# Patient Record
Sex: FEMALE | URBAN - METROPOLITAN AREA
[De-identification: names, ages, dates, MRNs, and addresses within clinical notes are randomized per-mention and may not be internally consistent; named-entity substitution may affect disease eponyms.]

---

## 2022-07-20 ENCOUNTER — WALK IN (OUTPATIENT)
Dept: URGENT CARE | Age: 22
End: 2022-07-20

## 2022-07-20 VITALS
WEIGHT: 131.6 LBS | DIASTOLIC BLOOD PRESSURE: 60 MMHG | RESPIRATION RATE: 16 BRPM | HEIGHT: 66 IN | BODY MASS INDEX: 21.15 KG/M2 | SYSTOLIC BLOOD PRESSURE: 100 MMHG | HEART RATE: 110 BPM | OXYGEN SATURATION: 98 % | TEMPERATURE: 98.7 F

## 2022-07-20 DIAGNOSIS — J02.9 PHARYNGITIS, UNSPECIFIED ETIOLOGY: Primary | ICD-10-CM

## 2022-07-20 LAB
INTERNAL PROCEDURAL CONTROLS ACCEPTABLE: YES
S PYO AG THROAT QL IA.RAPID: NEGATIVE

## 2022-07-20 PROCEDURE — 87880 STREP A ASSAY W/OPTIC: CPT | Performed by: NURSE PRACTITIONER

## 2022-07-20 PROCEDURE — 99203 OFFICE O/P NEW LOW 30 MIN: CPT | Performed by: NURSE PRACTITIONER

## 2022-07-20 PROCEDURE — 87081 CULTURE SCREEN ONLY: CPT | Performed by: INTERNAL MEDICINE

## 2022-07-20 ASSESSMENT — ENCOUNTER SYMPTOMS
CHILLS: 0
CONSTITUTIONAL NEGATIVE: 1
SWOLLEN GLANDS: 0
ABDOMINAL PAIN: 0
SINUS PAIN: 0
RESPIRATORY NEGATIVE: 1
CONSTIPATION: 0
HEADACHES: 0
TROUBLE SWALLOWING: 0
NAUSEA: 0
PSYCHIATRIC NEGATIVE: 1
WEAKNESS: 0
HOARSE VOICE: 0
RHINORRHEA: 0
EYES NEGATIVE: 1
SORE THROAT: 1
VOMITING: 0
FEVER: 0
APPETITE CHANGE: 0
DIARRHEA: 0
GASTROINTESTINAL NEGATIVE: 1
CHEST TIGHTNESS: 0
SHORTNESS OF BREATH: 0
LIGHT-HEADEDNESS: 0
COUGH: 0
SINUS PRESSURE: 0
DIZZINESS: 0
WHEEZING: 0
SEIZURES: 0
FATIGUE: 0
HEMATOLOGIC/LYMPHATIC NEGATIVE: 1
ENDOCRINE NEGATIVE: 1

## 2022-07-23 LAB — S PYO SPEC QL CULT: NORMAL

## 2022-07-26 ENCOUNTER — TELEPHONE (OUTPATIENT)
Dept: URGENT CARE | Age: 22
End: 2022-07-26

## 2023-02-09 DIAGNOSIS — M25.562 LEFT KNEE PAIN, UNSPECIFIED CHRONICITY: Primary | ICD-10-CM

## 2023-02-14 ENCOUNTER — HOSPITAL ENCOUNTER (OUTPATIENT)
Dept: PHYSICAL THERAPY | Age: 23
Setting detail: THERAPIES SERIES
Discharge: HOME | End: 2023-02-14
Payer: COMMERCIAL

## 2023-02-14 PROCEDURE — 97110 THERAPEUTIC EXERCISES: CPT

## 2023-02-14 PROCEDURE — 97161 PT EVAL LOW COMPLEX 20 MIN: CPT

## 2023-02-14 NOTE — FLOWSHEET NOTE
[place] [address] [facility]  Phone: [phone] Fax: [phone]    Physical Therapy Treatment Note/ Progress Report:       Date:  2023    Patient Name:  Miguel Angel Fitzpatrick    :  2000  MRN: [id number]  Restrictions/Precautions:    Medical/Treatment Diagnosis Information:   Diagnosis:  L knee pain   Treatment diagnosis:  M25.562 L knee pain; M22.22 Patellofemoral disorders of L knee       Insurance/Certification information:  Aetna - ded not met/no copay/auth   Physician Information:  Lilly Rangel MD  Plan of care signed (Y/N):     Date of Patient follow up with Physician:      Progress Report: [x]  Yes  []  No     Date Range for reporting period:  Beginnin23  Endin days or 10 visits    Progress report due (10 Rx/or 30 days whichever is less): 48     Recertification due (POC duration/ or 90 days whichever is less): 3/14/23     Visit # Insurance Allowable Auth Needed   1 Aetna - 20 PT visits []Yes    [x]No     Latex Allergy:  [x]NO      []YES  Preferred Language for Healthcare:   [x]English       []other:  Functional Scale: 10% disability - LEFS (72/80)     Date assessed:23    Pain level:  0/10 at start of session. 0/10 at best. 5-6/10 at worst.      SUBJECTIVE:  See eval    OBJECTIVE: See eval  Observation:   Test measurements:      RESTRICTIONS/PRECAUTIONS:     Exercises/Interventions:     Therapeutic Ex (91246)  Sets/sec Reps Notes/CUES   Retro Stepper/BIKE      [email or web address] LOP (80 mmHg)  8 min 30-15-15-15  SLR flexion/sidelying hip abd/SAQ over bolster/LAQ at EOB/standing hamstring curl   BOSU lunge isos fwd 5-10s 8-10 Fatigued quickly on her L. Leg press - 2 up SL down - 0-60 deg - 30# 1 10          Patient education.   8 min Findings, purpose, focus, goals, expectations of PT; HEP                                                   Manual Intervention (30818)      L patella mobs - medial glides  nv Pt last seen on 5/21/2021 by Dr Dean,  Letter went out to him to reschedule a November 2021 appointment,there is no future appointment,please call him to set up. Will refill bp med per Dr Dean.   IASTM to distal quad  nv                            NMR re-education (86094)   CUES NEEDED   Sammarinese/Biofeedback 10/10      BFR      G. Med activation      Hip Ext full ROM/ G. Activation      Bosu Bal and Prop- G Med      Single leg stance/Balance/Prop      Bosu Retro G. Med act      Prone Hip froggers- sliders/elevated            Therapeutic Activity (47608)      Ladders      [employer] Groupos      Dynamic Balance                            Therapeutic Exercise and NMR EXR  [x] (51289) Provided verbal/tactile cueing for activities related to strengthening, flexibility, endurance, ROM for improvements in LE, proximal hip, and core control with self care, mobility, lifting, ambulation. [x] (62066) Provided verbal/tactile cueing for activities related to improving balance, coordination, kinesthetic sense, posture, motor skill, proprioception  to assist with LE, proximal hip, and core control in self care, mobility, lifting, ambulation and eccentric single leg control.      NMR and Therapeutic Activities:    [x] (60191 or 50056) Provided verbal/tactile cueing for activities related to improving balance, coordination, kinesthetic sense, posture, motor skill, proprioception and motor activation to allow for proper function of core, proximal hip and LE with self care and ADLs and functional mobility.   [] (21474) Gait Re-education- Provided training and instruction to the patient for proper LE, core and proximal hip recruitment and positioning and eccentric body weight control with ambulation re-education including up and down stairs     Home Exercise Program:    [x] (92334) Reviewed/Progressed HEP activities related to strengthening, flexibility, endurance, ROM of core, proximal hip and LE for functional self-care, mobility, lifting and ambulation/stair navigation   [] (69227)Reviewed/Progressed HEP activities related to improving balance, coordination, kinesthetic sense, posture, motor skill, proprioception of core, proximal hip and LE for self care, mobility, lifting, and ambulation/stair navigation      Manual Treatments:  PROM / STM / Oscillations-Mobs:  G-I, II, III, IV (PA's, Inf., Post.)  [x] (24665) Provided manual therapy to mobilize LE, proximal hip and/or LS spine soft tissue/joints for the purpose of modulating pain, promoting relaxation,  increasing ROM, reducing/eliminating soft tissue swelling/inflammation/restriction, improving soft tissue extensibility and allowing for proper ROM for normal function with self care, mobility, lifting and ambulation. Modalities:     [] GAME READY (VASO)- for significant edema, swelling, pain control. Charges:  Timed Code Treatment Minutes: 22   Total Treatment Minutes: 45      [x] EVAL (LOW) 35148 (typically 20 minutes face-to-face)  [] EVAL (MOD) 77666 (typically 30 minutes face-to-face)  [] EVAL (HIGH) 39697 (typically 45 minutes face-to-face)  [] RE-EVAL     [x] YB(99526) x 1    [] DRY NEEDLE 1 OR 2 MUSCLES  [] NMR (93219) x     [] DRY NEEDLE 3+ MUSCLES  [] Manual (34707) x       [] TA (20802) x     [] Mech Traction (30212)  [] ES(attended) (51150)     [] ES (un) (91200):   [] VASO (48614)  [] Other:      GOALS:  Patient stated goal: Be able to lift without pain. [] Progressing: [] Met: [] Not Met: [] Adjusted  Therapist goals for Patient:   Short Term Goals: To be achieved in: 2 weeks  1. Independent in HEP and progression per patient tolerance, in order to prevent re-injury. [] Progressing: [] Met: [] Not Met: [] Adjusted  2. Patient will have a decrease in pain to facilitate improvement in movement, function, and ADLs as indicated by Functional Deficits. [] Progressing: [] Met: [] Not Met: [] Adjusted    Long Term Goals: To be achieved in: 6-8 weeks  1. Disability index score of 5% or less for the LEFS to assist with reaching prior level of function. [] Progressing: [] Met: [] Not Met: [] Adjusted  2.  Patient will demonstrate an increase in hip and quad strength and control, within 5# HHD in LE to allow for proper functional mobility as indicated by patients Functional Deficits. [] Progressing: [] Met: [] Not Met: [] Adjusted  3. Patient will be able to go up/down stairs with reciprocal mechanics without increased symptoms or restriction. [] Progressing: [] Met: [] Not Met: [] Adjusted  4. Patient will be able to squat and lunge without increased symptoms or restriction. [] Progressing: [] Met: [] Not Met: [] Adjusted     ASSESSMENT:  See eval    Return to Play: (if applicable)   []  Stage 1: Intro to Strength   []  Stage 2: Return to Run and Strength   []  Stage 3: Return to Jump and Strength   []  Stage 4: Dynamic Strength and Agility   []  Stage 5: Sport Specific Training     []  Ready to Return to Play, Meets All Above Stages   []  Not Ready for Return to Sports   Comments:            Treatment/Activity Tolerance:  [x] Patient tolerated treatment well [] Patient limited by fatique  [] Patient limited by pain  [] Patient limited by other medical complications  [] Other:     Overall Progression Towards Functional goals/ Treatment Progress Update:  [] Patient is progressing as expected towards functional goals listed. [] Progression is slowed due to complexities/Impairments listed. [] Progression has been slowed due to co-morbidities.   [x] Plan just implemented, too soon to assess goals progression <30days   [] Goals require adjustment due to lack of progress  [] Patient is not progressing as expected and requires additional follow up with physician  [] Other    Prognosis for POC: [x] Good [] Fair  [] Poor    Patient requires continued skilled intervention: [x] Yes  [] No        PLAN: See eval  [] Continue per plan of care [] Alter current plan (see comments)  [x] Plan of care initiated [] Hold pending MD visit [] Discharge    Electronically signed by: Kelvin Gardner, PT, DPT, MS, SCS    Note: If patient does not return for scheduled/recommended follow up visits, this note will serve as a discharge from care along with the most recent update on progress.

## 2023-02-14 NOTE — PLAN OF CARE
[facility]retchen Cazares  Phone: [phone]   Fax:     [phone]                                                       Physical Therapy Certification    Dear Tressa Smith MD,    We had the pleasure of evaluating the following patient for physical therapy services at [address] [place] [address]. A summary of our findings can be found in the initial assessment below. This includes our plan of care. If you have any questions or concerns regarding these findings, please do not hesitate to contact me at the office phone number checked above. Thank you for the referral.       Physician Signature:_______________________________Date:__________________  By signing above (or electronic signature), therapists plan is approved by physician      Patient: Theo Soto   : 2000   MRN: [id number]  Referring Physician: Tressa Smith MD      Evaluation Date: 2023     Medical Diagnosis Information:   Diagnosis:  L knee pain   Treatment diagnosis:  M25.562 L knee pain; M22.22 Patellofemoral disorders of L knee                                          Insurance information:  Aetna - ded not met/no copay/auth     Precautions/ Contra-indications: None. Latex Allergy:  [x]NO      []YES  Preferred Language for Healthcare:   [x]English       []other:    C-SSRS Triggered by Intake questionnaire (Past 2 wk assessment ):   [x] No, Questionnaire did not trigger screening.   [] Yes, Patient intake triggered C-SSRS Screening      [] C-SSRS Screening completed  [] PCP notified via Epic     SUBJECTIVE: Patient stated complaint:  Patient has had L>R knee pain on and off since she was in high school in competitive cheerleading, however, her L pain has gotten worse, more frequent and intense over the past month or so.  She does regularly go to the gym and lift weights for strengthening and she typically enjoys doing the stair stepper for cardio. She has had to modify these activities over the past month due to worsening pain in the L knee after her workouts. Patient does not recall any major changes in her typical workout regimen or daily activities that could have made the pain worsen. Primarily having to modify activities like lunges, squatting, and step ups/downs because they worsen the pain. Relevant Medical History:  None. Functional Scale/Score:  10% disability - LEFS (72/80)    Pain Scale: 0/10 at start of session. 0/10 at best. 5-6/10 at worst.   Easing factors: Avoiding aggrevating activities. Provocative factors: Squatting. Lunging. Going up stairs. Type: []Constant   [x]Intermittent  []Radiating [x]Localized []other:     Numbness/Tingling: Denies N/T. Occupation/School: [employer] at [employer], [employer], and [address] [place] [address]. Living Status/Prior Level of Function: Independent with ADLs and IADLs. Would like to be able to work out at the rec center daily doing free weights, machines, and cardio. OBJECTIVE:     ROM LEFT RIGHT   HIP Flex WNL WNL   HIP Abd     HIP Ext     HIP IR WNL WNL   HIP ER WNL WNL   Knee ext 0//+2 Hyper 0/+2 Hyper   Knee Flex 142 142   Ankle PF     Ankle DF     Ankle In     Ankle Ev     Strength  LEFT RIGHT   HIP Flexors     HIP Abductors 19.3# 23.0#   HIP Ext 20.1# 19.5#   Hip ER     Knee EXT (quad) 30.6# 33.7#   Knee Flex (HS) 31.6# 27.5#   Ankle DF     Ankle PF     Ankle Inv     Ankle EV          Circumference  Mid apex  7 cm prox     No major swelling/edema evident. No major swelling/edema evident. Reflexes/Sensation:    [x]Dermatomes/Myotomes intact    [x]Reflexes equal and normal bilaterally   []Other:    Joint mobility: L tibiofemoral; L patella   [x]Normal    []Hypo   []Hyper    Palpation: Patient is tender to distal quad more at the VMO and along medial and lateral joint lines.     Functional Mobility/Transfers:     Posture:     Gait: WNL    Orthopedic Special Tests: Hamstring flexibility WNL bilaterally. Rectus femoris mildly restricted bilaterally in prone. [x] Patient history, allergies, meds reviewed. Medical chart reviewed. See intake form. Review Of Systems (ROS):  [x]Performed Review of systems (Integumentary, CardioPulmonary, Neurological) by intake and observation. Intake form has been scanned into medical record. Patient has been instructed to contact their primary care physician regarding ROS issues if not already being addressed at this time.       Co-morbidities/Complexities (which will affect course of rehabilitation):   [x]None           Arthritic conditions   []Rheumatoid arthritis (M05.9)  []Osteoarthritis (M19.91)   Cardiovascular conditions   []Hypertension (I10)  []Hyperlipidemia (E78.5)  []Angina pectoris (I20)  []Atherosclerosis (I70)  []CVA Musculoskeletal conditions   []Disc pathology   []Congenital spine pathologies   []Prior surgical intervention  []Osteoporosis (M81.8)  []Osteopenia (M85.8)   Endocrine conditions   []Hypothyroid (E03.9)  []Hyperthyroid Gastrointestinal conditions   []Constipation (A68.63)   Metabolic conditions   []Morbid obesity (E66.01)  []Diabetes type 1(E10.65) or 2 (E11.65)   []Neuropathy (G60.9)     Pulmonary conditions   []Asthma (J45)  []Coughing   []COPD (J44.9)   Psychological Disorders  []Anxiety (F41.9)  []Depression (F32.9)   []Other:   []Other:          Barriers to/and or personal factors that will affect rehab potential:              []Age  []Sex    []Smoker              []Motivation/Lack of Motivation                        []Co-Morbidities              []Cognitive Function, education/learning barriers              []Environmental, home barriers              []profession/work barriers  []past PT/medical experience  [x]other:  chronic on/off nature of pain sxs  Justification: Chronic on/off nature of pain sxs indicates increased likelihood for prolonged prognosis for full, safe return to PLOF without restrictions. Falls Risk Assessment (30 days):   [x] Falls Risk assessed and no intervention required. [] Falls Risk assessed and Patient requires intervention due to being higher risk   TUG score (>12s at risk):     [] Falls education provided, including         ASSESSMENT: s/s consistent with patellofemoral pain syndrome, patellofemoral tracking issues  Functional Impairments:     [x]Noted lumbar/proximal hip/LE hypomobility   [x]Decreased LE functional ROM   [x]Decreased core/proximal hip strength and neuromuscular control   [x]Decreased LE functional strength   [x]Reduced balance/proprioceptive control   []other:      Functional Activity Limitations (from functional questionnaire and intake)   [x]Reduced ability to tolerate prolonged functional positions   [x]Reduced ability or difficulty with changes of positions or transfers between positions   [x]Reduced ability to maintain good posture and demonstrate good body mechanics with sitting, bending, and lifting   []Reduced ability to sleep   [] Reduced ability or tolerance with driving and/or computer work   [x]Reduced ability to perform lifting, carrying tasks   [x]Reduced ability to squat   []Reduced ability to forward bend   []Reduced ability to ambulate prolonged functional periods/distances/surfaces   [x]Reduced ability to ascend/descend stairs   [x]Reduced ability to run, hop or jump   []other:     Participation Restrictions   [x]Reduced participation in self care activities   [x]Reduced participation in home management activities   []Reduced participation in work activities   [x]Reduced participation in social activities. [x]Reduced participation in sport activities. Classification :    []Signs/symptoms consistent with post-surgical status including decreased ROM, strength and function.    []Signs/symptoms consistent with joint sprain/strain   [x]Signs/symptoms consistent with patella-femoral syndrome   []Signs/symptoms consistent with knee OA/hip OA   []Signs/symptoms consistent with internal derangement of knee/Hip   []Signs/symptoms consistent with functional hip weakness/NMR control      []Signs/symptoms consistent with tendinitis/tendinosis    [x]signs/symptoms consistent with pathology which may benefit from Dry needling      []other:      Prognosis/Rehab Potential:      [x]Excellent   []Good    []Fair   []Poor    Tolerance of evaluation/treatment:    [x]Excellent   []Good    []Fair   []Poor    Physical Therapy Evaluation Complexity Justification  [x] A history of present problem with:  [x] no personal factors and/or comorbidities that impact the plan of care;  []1-2 personal factors and/or comorbidities that impact the plan of care  []3 personal factors and/or comorbidities that impact the plan of care  [x] An examination of body systems using standardized tests and measures addressing any of the following: body structures and functions (impairments), activity limitations, and/or participation restrictions;:  [x] a total of 1-2 or more elements   [] a total of 3 or more elements   [] a total of 4 or more elements   [x] A clinical presentation with:  [x] stable and/or uncomplicated characteristics   [] evolving clinical presentation with changing characteristics  [] unstable and unpredictable characteristics;   [x] Clinical decision making of [x] low, [] moderate, [] high complexity using standardized patient assessment instrument and/or measurable assessment of functional outcome.     [x] EVAL (LOW) 86669 (typically 20 minutes face-to-face)  [] EVAL (MOD) 94325 (typically 30 minutes face-to-face)  [] EVAL (HIGH) 20247 (typically 45 minutes face-to-face)  [] RE-EVAL     Frequency/Duration:  2 days per week for 6-8 Weeks:  Interventions:  [x]  Therapeutic exercise including: strength training, ROM, for Lower extremity and core   [x]  NMR activation and proprioception for LE, Glutes and Core   [x]  Manual therapy as indicated for LE, Hip and spine to include: Dry Needling/IASTM, STM, PROM, Gr I-IV mobilizations, manipulation. [x] Modalities as needed that may include: thermal agents, E-stim, Biofeedback, US, iontophoresis as indicated  [x] Patient education on joint protection, postural re-education, activity modification, progression of HEP. GOALS:  Patient stated goal: Be able to lift without pain. [] Progressing: [] Met: [] Not Met: [] Adjusted  Therapist goals for Patient:   Short Term Goals: To be achieved in: 2 weeks  1. Independent in HEP and progression per patient tolerance, in order to prevent re-injury. [] Progressing: [] Met: [] Not Met: [] Adjusted  2. Patient will have a decrease in pain to facilitate improvement in movement, function, and ADLs as indicated by Functional Deficits. [] Progressing: [] Met: [] Not Met: [] Adjusted    Long Term Goals: To be achieved in: 6-8 weeks  1. Disability index score of 5% or less for the LEFS to assist with reaching prior level of function. [] Progressing: [] Met: [] Not Met: [] Adjusted  2. Patient will demonstrate an increase in hip and quad strength and control, within 5# HHD in LE to allow for proper functional mobility as indicated by patients Functional Deficits. [] Progressing: [] Met: [] Not Met: [] Adjusted  3. Patient will be able to go up/down stairs with reciprocal mechanics without increased symptoms or restriction. [] Progressing: [] Met: [] Not Met: [] Adjusted  4. Patient will be able to squat and lunge without increased symptoms or restriction.   [] Progressing: [] Met: [] Not Met: [] Adjusted     Electronically signed by:  Ching Cary, PT, DPT, MS, SCS

## 2023-02-16 ENCOUNTER — HOSPITAL ENCOUNTER (OUTPATIENT)
Dept: PHYSICAL THERAPY | Age: 23
Setting detail: THERAPIES SERIES
Discharge: HOME | End: 2023-02-16
Payer: COMMERCIAL

## 2023-02-16 PROCEDURE — 97140 MANUAL THERAPY 1/> REGIONS: CPT

## 2023-02-16 PROCEDURE — 97110 THERAPEUTIC EXERCISES: CPT

## 2023-02-16 NOTE — FLOWSHEET NOTE
[place] [address] [facility]  Phone: [phone] Fax: [phone]    Physical Therapy Treatment Note/ Progress Report:       Date:  2023    Patient Name:  Jennifer Galarza    :  2000  MRN: [id number]  Restrictions/Precautions:    Medical/Treatment Diagnosis Information:   Diagnosis:  L knee pain   Treatment diagnosis:  M25.562 L knee pain; M22.22 Patellofemoral disorders of L knee       Insurance/Certification information:  [employer]na - ded not met/no copay/auth   Physician Information:  Liss Vasquez MD  Plan of care signed (Y/N):     Date of Patient follow up with Physician:      Progress Report: []  Yes  [x]  No     Date Range for reporting period:  Beginnin23  Endin days or 10 visits    Progress report due (10 Rx/or 30 days whichever is less):      Recertification due (POC duration/ or 90 days whichever is less): 3/14/23     Visit # Insurance Allowable Auth Needed   2 [employer]na - 20 PT visits []Yes    [x]No     Latex Allergy:  [x]NO      []YES  Preferred Language for Healthcare:   [x]English       []other:  Functional Scale: 10% disability - LEFS (72/80)     Date assessed:23    Pain level:  0/10 at start of session. 0/10 at best. 5-6/10 at worst.      SUBJECTIVE:  States that initially after the session her L knee felt good, but the pain was delayed and came on about 30 minutes after she left. OBJECTIVE: See eval  Observation:   Test measurements:      RESTRICTIONS/PRECAUTIONS:     Exercises/Interventions:     Therapeutic Ex (22256)  Sets/sec Reps Notes/CUES   Retro Stepper/BIKE      Alexus@[employer] LOP (80 mmHg)  8 min 30-15-15-15  SLR flexion/sidelying hip abd/SAQ over bolster/LAQ at EOB/standing hamstring curl   BOSU lunge isos fwd 5s 10 Fatigued quickly on her L.    Leg press - isos - DL - HEAVY 10s 10 45 deg; 60 deg                                                               Manual Intervention (20518)      L patella mobs - medial glides  10 min Bilat; Gr. II-III   IASTM to distal rectus femoris, ITB, VL  12 min bilat                           NMR re-education (94927)   CUES NEEDED   [place]/Biofeedback 10/10      BFR      G. Med activation      Hip Ext full ROM/ G. Activation      Bosu Bal and Prop- G Med      Single leg stance/Balance/Prop      Bosu Retro G. Med act      Prone Hip froggers- sliders/elevated            Therapeutic Activity (18050)      Ladders      Plyos      Dynamic Balance                            Therapeutic Exercise and NMR EXR  [x] (43933) Provided verbal/tactile cueing for activities related to strengthening, flexibility, endurance, ROM for improvements in LE, proximal hip, and core control with self care, mobility, lifting, ambulation. [x] (21097) Provided verbal/tactile cueing for activities related to improving balance, coordination, kinesthetic sense, posture, motor skill, proprioception  to assist with LE, proximal hip, and core control in self care, mobility, lifting, ambulation and eccentric single leg control.      NMR and Therapeutic Activities:    [x] (69025 or 68530) Provided verbal/tactile cueing for activities related to improving balance, coordination, kinesthetic sense, posture, motor skill, proprioception and motor activation to allow for proper function of core, proximal hip and LE with self care and ADLs and functional mobility.   [] (45226) Gait Re-education- Provided training and instruction to the patient for proper LE, core and proximal hip recruitment and positioning and eccentric body weight control with ambulation re-education including up and down stairs     Home Exercise Program:    [x] (31587) Reviewed/Progressed HEP activities related to strengthening, flexibility, endurance, ROM of core, proximal hip and LE for functional self-care, mobility, lifting and ambulation/stair navigation   [] (31968)Reviewed/Progressed HEP activities related to improving balance, coordination, kinesthetic sense, posture, motor skill, proprioception of core, proximal hip and LE for self care, mobility, lifting, and ambulation/stair navigation      Manual Treatments:  PROM / STM / Oscillations-Mobs:  G-I, II, III, IV (PA's, Inf., Post.)  [x] (20449) Provided manual therapy to mobilize LE, proximal hip and/or LS spine soft tissue/joints for the purpose of modulating pain, promoting relaxation,  increasing ROM, reducing/eliminating soft tissue swelling/inflammation/restriction, improving soft tissue extensibility and allowing for proper ROM for normal function with self care, mobility, lifting and ambulation. Modalities:     [] GAME READY (VASO)- for significant edema, swelling, pain control. Charges:  Timed Code Treatment Minutes: 45   Total Treatment Minutes: 45      [] EVAL (LOW) 87794 (typically 20 minutes face-to-face)  [] EVAL (MOD) 91374 (typically 30 minutes face-to-face)  [] EVAL (HIGH) 44175 (typically 45 minutes face-to-face)  [] RE-EVAL     [x] BH(00380) x 1    [] DRY NEEDLE 1 OR 2 MUSCLES  [] NMR (42603) x     [] DRY NEEDLE 3+ MUSCLES  [x] Manual (41618) x 2      [] TA (99185) x     [] Mech Traction (66943)  [] ES(attended) (38089)     [] ES (un) (21731):   [] VASO (10321)  [] Other:      GOALS:  Patient stated goal: Be able to lift without pain. [] Progressing: [] Met: [] Not Met: [] Adjusted  Therapist goals for Patient:   Short Term Goals: To be achieved in: 2 weeks  1. Independent in HEP and progression per patient tolerance, in order to prevent re-injury. [] Progressing: [] Met: [] Not Met: [] Adjusted  2. Patient will have a decrease in pain to facilitate improvement in movement, function, and ADLs as indicated by Functional Deficits. [] Progressing: [] Met: [] Not Met: [] Adjusted    Long Term Goals: To be achieved in: 6-8 weeks  1. Disability index score of 5% or less for the LEFS to assist with reaching prior level of function.    [] Progressing: [] Met: [] Not Met: [] Adjusted  2. Patient will demonstrate an increase in hip and quad strength and control, within 5# HHD in LE to allow for proper functional mobility as indicated by patients Functional Deficits. [] Progressing: [] Met: [] Not Met: [] Adjusted  3. Patient will be able to go up/down stairs with reciprocal mechanics without increased symptoms or restriction. [] Progressing: [] Met: [] Not Met: [] Adjusted  4. Patient will be able to squat and lunge without increased symptoms or restriction. [] Progressing: [] Met: [] Not Met: [] Adjusted     ASSESSMENT:  Reports delayed onset of L knee discomfort following previous session. Patellar mobility is significantly restricted on both knees, particularly medial patellar glide. Addressed patellar restrictions with mobilizations and IASTM to tight, tender distal rectus femoris and vastus lateralis followed by gentle quad loading tasks including BFR, focusing on isometrics to load without further irritating patient's pain sxs. Will f/u about response at nv considering delayed response previously. Return to Play: (if applicable)   []  Stage 1: Intro to Strength   []  Stage 2: Return to Run and Strength   []  Stage 3: Return to Jump and Strength   []  Stage 4: Dynamic Strength and Agility   []  Stage 5: Sport Specific Training     []  Ready to Return to Play, Meets All Above Stages   []  Not Ready for Return to Sports   Comments:            Treatment/Activity Tolerance:  [x] Patient tolerated treatment well [] Patient limited by fatique  [] Patient limited by pain  [] Patient limited by other medical complications  [] Other:     Overall Progression Towards Functional goals/ Treatment Progress Update:  [] Patient is progressing as expected towards functional goals listed. [] Progression is slowed due to complexities/Impairments listed. [] Progression has been slowed due to co-morbidities.   [x] Plan just implemented, too soon to assess goals progression <30days   [] Goals require adjustment due to lack of progress  [] Patient is not progressing as expected and requires additional follow up with physician  [] Other    Prognosis for POC: [x] Good [] Fair  [] Poor    Patient requires continued skilled intervention: [x] Yes  [] No        PLAN: 2x per week for 2-3 weeks  [x] Continue per plan of care [] Alter current plan (see comments)  [] Plan of care initiated [] Hold pending MD visit [] Discharge    Electronically signed by: Isidro Izquierdo PT, DPT, MS, SCS    Note: If patient does not return for scheduled/recommended follow up visits, this note will serve as a discharge from care along with the most recent update on progress.

## 2023-02-21 ENCOUNTER — HOSPITAL ENCOUNTER (OUTPATIENT)
Dept: PHYSICAL THERAPY | Age: 23
Setting detail: THERAPIES SERIES
Discharge: HOME | End: 2023-02-21
Payer: COMMERCIAL

## 2023-02-21 PROCEDURE — 97140 MANUAL THERAPY 1/> REGIONS: CPT

## 2023-02-21 NOTE — FLOWSHEET NOTE
[place] [address] [facility]  Phone: [phone] Fax: [phone]    Physical Therapy Treatment Note/ Progress Report:       Date:  2023    Patient Name:  Arlene Ewing    :  2000  MRN: [id number]  Restrictions/Precautions:    Medical/Treatment Diagnosis Information:   Diagnosis:  L knee pain   Treatment diagnosis:  M25.562 L knee pain; M22.22 Patellofemoral disorders of L knee       Insurance/Certification information:  Aetna - ded not met/no copay/auth   Physician Information:  Patricia Varghese MD  Plan of care signed (Y/N):     Date of Patient follow up with Physician:      Progress Report: []  Yes  [x]  No     Date Range for reporting period:  Beginnin23  Endin days or 10 visits    Progress report due (10 Rx/or 30 days whichever is less):      Recertification due (POC duration/ or 90 days whichever is less): 3/14/23     Visit # Insurance Allowable Auth Needed   3 [employer] - 20 PT visits []Yes    [x]No     Latex Allergy:  [x]NO      []YES  Preferred Language for Healthcare:   [x]English       []other:  Functional Scale: 10% disability - LEFS (72/80)     Date assessed:23    Pain level:  0/10 at start of session. 0/10 at best. 5-6/10 at worst.      SUBJECTIVE:  Reports no change in pain sxs following previous session. Pain no worse and no better. OBJECTIVE: See eval  Observation:   Test measurements:      RESTRICTIONS/PRECAUTIONS:     Exercises/Interventions:     Therapeutic Ex (01957)  Sets/sec Reps Notes/CUES   Retro Stepper/BIKE      Half kneeling rectus femoris stretch w/strap 30s 3 tatyanaat   [email or web address] LOP (80 mmHg)  8 min 30-15-15-15  SLR flexion/sidelying hip abd/SAQ over bolster/LAQ at EOB/standing hamstring curl   BOSU lunge isos fwd 5s 10 Fatigued quickly on her L.    Leg press - isos - DL - HEAVY 10s 10 45 deg; 60 deg                                                               Manual Intervention (41666)      L patella mobs - medial glides  12 min Bilat; Gr. II-III   IASTM to distal rectus femoris, ITB, VL  18 min bilat                           NMR re-education (28634)   CUES NEEDED   Cymro/Biofeedback 10/10      BFR      G. Med activation      Hip Ext full ROM/ G. Activation      Bosu Bal and Prop- G Med      Single leg stance/Balance/Prop      Bosu Retro G. Med act      Prone Hip froggers- sliders/elevated            Therapeutic Activity (76174)      Ladders      Plyos      Dynamic Balance                            Therapeutic Exercise and NMR EXR  [x] (33611) Provided verbal/tactile cueing for activities related to strengthening, flexibility, endurance, ROM for improvements in LE, proximal hip, and core control with self care, mobility, lifting, ambulation. [x] (20351) Provided verbal/tactile cueing for activities related to improving balance, coordination, kinesthetic sense, posture, motor skill, proprioception  to assist with LE, proximal hip, and core control in self care, mobility, lifting, ambulation and eccentric single leg control.      NMR and Therapeutic Activities:    [x] (44159 or 25643) Provided verbal/tactile cueing for activities related to improving balance, coordination, kinesthetic sense, posture, motor skill, proprioception and motor activation to allow for proper function of core, proximal hip and LE with self care and ADLs and functional mobility.   [] (59644) Gait Re-education- Provided training and instruction to the patient for proper LE, core and proximal hip recruitment and positioning and eccentric body weight control with ambulation re-education including up and down stairs     Home Exercise Program:    [x] (27243) Reviewed/Progressed HEP activities related to strengthening, flexibility, endurance, ROM of core, proximal hip and LE for functional self-care, mobility, lifting and ambulation/stair navigation   [] (08307)Reviewed/Progressed HEP activities related to improving balance, coordination, kinesthetic sense, posture, motor skill, proprioception of core, proximal hip and LE for self care, mobility, lifting, and ambulation/stair navigation      Manual Treatments:  PROM / STM / Oscillations-Mobs:  G-I, II, III, IV (PA's, Inf., Post.)  [x] (77608) Provided manual therapy to mobilize LE, proximal hip and/or LS spine soft tissue/joints for the purpose of modulating pain, promoting relaxation,  increasing ROM, reducing/eliminating soft tissue swelling/inflammation/restriction, improving soft tissue extensibility and allowing for proper ROM for normal function with self care, mobility, lifting and ambulation. Modalities:     [] GAME READY (VASO)- for significant edema, swelling, pain control. Charges:  Timed Code Treatment Minutes: 35   Total Treatment Minutes: 35      [] EVAL (LOW) 04188 (typically 20 minutes face-to-face)  [] EVAL (MOD) 81775 (typically 30 minutes face-to-face)  [] EVAL (HIGH) 90837 (typically 45 minutes face-to-face)  [] RE-EVAL     [] [place](20565) x     [] DRY NEEDLE 1 OR 2 MUSCLES  [] NMR (57808) x     [] DRY NEEDLE 3+ MUSCLES  [x] Manual (99751) x 2      [] TA (64318) x     [] Mech Traction (22210)  [] ES(attended) (68287)     [] ES (un) (02771):   [] VASO (03093)  [] Other:      GOALS:  Patient stated goal: Be able to lift without pain. [] Progressing: [] Met: [] Not Met: [] Adjusted  Therapist goals for Patient:   Short Term Goals: To be achieved in: 2 weeks  1. Independent in HEP and progression per patient tolerance, in order to prevent re-injury. [] Progressing: [] Met: [] Not Met: [] Adjusted  2. Patient will have a decrease in pain to facilitate improvement in movement, function, and ADLs as indicated by Functional Deficits. [] Progressing: [] Met: [] Not Met: [] Adjusted    Long Term Goals: To be achieved in: 6-8 weeks  1. Disability index score of 5% or less for the LEFS to assist with reaching prior level of function.    [] Progressing: [] Met: [] Not Met: [] Adjusted  2. Patient will demonstrate an increase in hip and quad strength and control, within 5# HHD in LE to allow for proper functional mobility as indicated by patients Functional Deficits. [] Progressing: [] Met: [] Not Met: [] Adjusted  3. Patient will be able to go up/down stairs with reciprocal mechanics without increased symptoms or restriction. [] Progressing: [] Met: [] Not Met: [] Adjusted  4. Patient will be able to squat and lunge without increased symptoms or restriction. [] Progressing: [] Met: [] Not Met: [] Adjusted     ASSESSMENT:  Significant focus on addressing patellar mobility restrictions today with manual techniques and self stretching. Will likely trial DN to areas of soft tissue restriction at nv to see how this affects patient's pain sxs bilaterally. Return to Play: (if applicable)   []  Stage 1: Intro to Strength   []  Stage 2: Return to Run and Strength   []  Stage 3: Return to Jump and Strength   []  Stage 4: Dynamic Strength and Agility   []  Stage 5: Sport Specific Training     []  Ready to Return to Play, Meets All Above Stages   []  Not Ready for Return to Sports   Comments:            Treatment/Activity Tolerance:  [x] Patient tolerated treatment well [] Patient limited by fatique  [] Patient limited by pain  [] Patient limited by other medical complications  [] Other:     Overall Progression Towards Functional goals/ Treatment Progress Update:  [] Patient is progressing as expected towards functional goals listed. [] Progression is slowed due to complexities/Impairments listed. [] Progression has been slowed due to co-morbidities.   [x] Plan just implemented, too soon to assess goals progression <30days   [] Goals require adjustment due to lack of progress  [] Patient is not progressing as expected and requires additional follow up with physician  [] Other    Prognosis for POC: [x] Good [] Fair  [] Poor    Patient requires continued skilled intervention: [x] Yes  [] No        PLAN: 2x per week for 2-3 weeks  [x] Continue per plan of care [] Alter current plan (see comments)  [] Plan of care initiated [] Hold pending MD visit [] Discharge    Electronically signed by: Dejan Cazares, PT, DPT, MS, SCS    Note: If patient does not return for scheduled/recommended follow up visits, this note will serve as a discharge from care along with the most recent update on progress.

## 2023-02-23 ENCOUNTER — HOSPITAL ENCOUNTER (OUTPATIENT)
Dept: PHYSICAL THERAPY | Age: 23
Setting detail: THERAPIES SERIES
Discharge: HOME | End: 2023-02-23
Payer: COMMERCIAL

## 2023-02-23 PROCEDURE — 97140 MANUAL THERAPY 1/> REGIONS: CPT

## 2023-02-23 PROCEDURE — 97110 THERAPEUTIC EXERCISES: CPT

## 2023-02-23 NOTE — FLOWSHEET NOTE
[place] [address] [facility], [address]  Phone: [phone] Fax: [phone]    Physical Therapy Treatment Note/ Progress Report:       Date:  2023    Patient Name:  Reginald Villareal    :  2000  MRN: [id number]  Restrictions/Precautions:    Medical/Treatment Diagnosis Information:   Diagnosis:  L knee pain   Treatment diagnosis:  M25.562 L knee pain; M22.22 Patellofemoral disorders of L knee       Insurance/Certification information:  Aetna - ded not met/no copay/auth   Physician Information:  Lamberto Campa MD  Plan of care signed (Y/N):     Date of Patient follow up with Physician:      Progress Report: []  Yes  [x]  No     Date Range for reporting period:  Beginnin23  Endin days or 10 visits    Progress report due (10 Rx/or 30 days whichever is less): 56     Recertification due (POC duration/ or 90 days whichever is less): 3/14/23     Visit # Insurance Allowable Auth Needed   4 Aetna - 20 PT visits []Yes    [x]No     Latex Allergy:  [x]NO      []YES  Preferred Language for Healthcare:   [x]English       []other:  Functional Scale: 10% disability - LEFS (72/80)     Date assessed:23    Pain level:  0/10 at start of session. 0/10 at best. 5-6/10 at worst.      SUBJECTIVE:  Reports no change in pain sxs following previous session. Pain no worse and no better. OBJECTIVE: See eval  Observation:   Test measurements:      RESTRICTIONS/PRECAUTIONS:     Exercises/Interventions:     Therapeutic Ex (92485)  Sets/sec Reps Notes/CUES   Retro Stepper/BIKE      Half kneeling rectus femoris stretch w/strap 30s 3 bilat   [email or web address] LOP (93 mmHg)  8 min 30-15-15-15  SLR flexion/sidelying hip abd/SAQ over bolster/LAQ at EOB   BOSU lunge isos fwd 5s 10 Fatigued quickly on her L.    Leg press  - 2 up SL down - 55# 2 10 Ecc focus   Wall sits - blue TB loop at knees      Standing hip abd isos against wall 5s 10 bilat   SL glut bridge 5s 10 bilat                                                   Manual Intervention (66526)      L patella mobs - medial glides  12 min Bilat; Gr. II-III   IASTM to distal rectus femoris, ITB, VL  6 min bilat                           NMR re-education (29532)   CUES NEEDED   [place]/Biofeedback 10/10      BFR      G. Med activation      Hip Ext full ROM/ G. Activation      Bosu Bal and Prop- G Med      Single leg stance/Balance/Prop      Bosu Retro G. Med act      Prone Hip froggers- sliders/elevated            Therapeutic Activity (69088)      [employer]      Dynamic Balance                            Therapeutic Exercise and NMR EXR  [x] (88085) Provided verbal/tactile cueing for activities related to strengthening, flexibility, endurance, ROM for improvements in LE, proximal hip, and core control with self care, mobility, lifting, ambulation. [x] (81790) Provided verbal/tactile cueing for activities related to improving balance, coordination, kinesthetic sense, posture, motor skill, proprioception  to assist with LE, proximal hip, and core control in self care, mobility, lifting, ambulation and eccentric single leg control.      NMR and Therapeutic Activities:    [x] (75533 or 78807) Provided verbal/tactile cueing for activities related to improving balance, coordination, kinesthetic sense, posture, motor skill, proprioception and motor activation to allow for proper function of core, proximal hip and LE with self care and ADLs and functional mobility.   [] (23348) Gait Re-education- Provided training and instruction to the patient for proper LE, core and proximal hip recruitment and positioning and eccentric body weight control with ambulation re-education including up and down stairs     Home Exercise Program:    [x] (51189) Reviewed/Progressed HEP activities related to strengthening, flexibility, endurance, ROM of core, proximal hip and LE for functional self-care, mobility, lifting and ambulation/stair navigation   [] (37100)Reviewed/Progressed HEP activities related to improving balance, coordination, kinesthetic sense, posture, motor skill, proprioception of core, proximal hip and LE for self care, mobility, lifting, and ambulation/stair navigation      Manual Treatments:  PROM / STM / Oscillations-Mobs:  G-I, II, III, IV (PA's, Inf., Post.)  [x] (80156) Provided manual therapy to mobilize LE, proximal hip and/or LS spine soft tissue/joints for the purpose of modulating pain, promoting relaxation,  increasing ROM, reducing/eliminating soft tissue swelling/inflammation/restriction, improving soft tissue extensibility and allowing for proper ROM for normal function with self care, mobility, lifting and ambulation. Modalities:     [] GAME READY (VASO)- for significant edema, swelling, pain control. Charges:  Timed Code Treatment Minutes: 45   Total Treatment Minutes: 45      [] EVAL (LOW) 76622 (typically 20 minutes face-to-face)  [] EVAL (MOD) 07531 (typically 30 minutes face-to-face)  [] EVAL (HIGH) 95433 (typically 45 minutes face-to-face)  [] RE-EVAL     [x] EM(21626) x 2    [] DRY NEEDLE 1 OR 2 MUSCLES  [] NMR (12441) x     [] DRY NEEDLE 3+ MUSCLES  [x] Manual (47159) x 1      [] TA (47677) x     [] Mech Traction (23424)  [] ES(attended) (84489)     [] ES (un) (02916):   [] VASO (66667)  [] Other:      GOALS:  Patient stated goal: Be able to lift without pain. [] Progressing: [] Met: [] Not Met: [] Adjusted  Therapist goals for Patient:   Short Term Goals: To be achieved in: 2 weeks  1. Independent in HEP and progression per patient tolerance, in order to prevent re-injury. [] Progressing: [] Met: [] Not Met: [] Adjusted  2. Patient will have a decrease in pain to facilitate improvement in movement, function, and ADLs as indicated by Functional Deficits. [] Progressing: [] Met: [] Not Met: [] Adjusted    Long Term Goals: To be achieved in: 6-8 weeks  1.  Disability index score of 5% or less for the LEFS to assist with reaching prior level of function. [] Progressing: [] Met: [] Not Met: [] Adjusted  2. Patient will demonstrate an increase in hip and quad strength and control, within 5# HHD in LE to allow for proper functional mobility as indicated by patients Functional Deficits. [] Progressing: [] Met: [] Not Met: [] Adjusted  3. Patient will be able to go up/down stairs with reciprocal mechanics without increased symptoms or restriction. [] Progressing: [] Met: [] Not Met: [] Adjusted  4. Patient will be able to squat and lunge without increased symptoms or restriction. [] Progressing: [] Met: [] Not Met: [] Adjusted     ASSESSMENT:  Cont focus on addressing patellar mobility restrictions today with manual techniques and self stretching. Did trial DN, but patient did not tolerate well, felt like she was going to pass out after insertion of a single needle, so terminated. Cont progression of quad strength and motor control tasks restricted to 0-45 degrees right up to onset of sharp pain. Do think patient has a hip control component contributing to poor patellar tracking, so initiated single leg hip control tasks today. Will f/u about response at nv. Return to Play: (if applicable)   []  Stage 1: Intro to Strength   []  Stage 2: Return to Run and Strength   []  Stage 3: Return to Jump and Strength   []  Stage 4: Dynamic Strength and Agility   []  Stage 5: Sport Specific Training     []  Ready to Return to Play, Meets All Above Stages   []  Not Ready for Return to Sports   Comments:            Treatment/Activity Tolerance:  [x] Patient tolerated treatment well [] Patient limited by fatique  [] Patient limited by pain  [] Patient limited by other medical complications  [] Other:     Overall Progression Towards Functional goals/ Treatment Progress Update:  [] Patient is progressing as expected towards functional goals listed.     [] Progression is slowed due to complexities/Impairments listed. [] Progression has been slowed due to co-morbidities. [x] Plan just implemented, too soon to assess goals progression <30days   [] Goals require adjustment due to lack of progress  [] Patient is not progressing as expected and requires additional follow up with physician  [] Other    Prognosis for POC: [x] Good [] Fair  [] Poor    Patient requires continued skilled intervention: [x] Yes  [] No        PLAN: 2x per week for 2-3 weeks  [x] Continue per plan of care [] Alter current plan (see comments)  [] Plan of care initiated [] Hold pending MD visit [] Discharge    Electronically signed by: Dejan Cazares, PT, DPT, MS, SCS    Note: If patient does not return for scheduled/recommended follow up visits, this note will serve as a discharge from care along with the most recent update on progress.

## 2023-02-28 ENCOUNTER — HOSPITAL ENCOUNTER (OUTPATIENT)
Dept: PHYSICAL THERAPY | Age: 23
Setting detail: THERAPIES SERIES
Discharge: HOME | End: 2023-02-28
Payer: COMMERCIAL

## 2023-02-28 PROCEDURE — 97140 MANUAL THERAPY 1/> REGIONS: CPT

## 2023-02-28 PROCEDURE — 97110 THERAPEUTIC EXERCISES: CPT

## 2023-02-28 NOTE — FLOWSHEET NOTE
[place] [address] [facility] [address]  Phone: [phone] Fax: [phone]    Physical Therapy Treatment Note/ Progress Report:       Date:  2023    Patient Name:  Luisa Eisenmenger    :  2000  MRN: [id number]  Restrictions/Precautions:    Medical/Treatment Diagnosis Information:   Diagnosis:  L knee pain   Treatment diagnosis:  M25.562 L knee pain; M22.22 Patellofemoral disorders of L knee       Insurance/Certification information:  Aetna - ded not met/no copay/auth   Physician Information:  Odell Harada, MD  Plan of care signed (Y/N):     Date of Patient follow up with Physician:      Progress Report: []  Yes  [x]  No     Date Range for reporting period:  Beginnin23  Endin days or 10 visits    Progress report due (10 Rx/or 30 days whichever is less):      Recertification due (POC duration/ or 90 days whichever is less): 3/14/23     Visit # Insurance Allowable Auth Needed   5 Aetna - 20 PT visits []Yes    [x]No     Latex Allergy:  [x]NO      []YES  Preferred Language for Healthcare:   [x]English       []other:  Functional Scale: 10% disability - LEFS (72/80)     Date assessed:23    Pain level:  0/10 at start of session. 0/10 at best. 5-6/10 at worst.      SUBJECTIVE:  Reports no soreness, pain in L kneecap after previous session. OBJECTIVE: See eval  Observation:   Test measurements:      RESTRICTIONS/PRECAUTIONS:     Exercises/Interventions:     Therapeutic Ex (12033)  Sets/sec Reps Notes/CUES   Retro Stepper/BIKE      Half kneeling rectus femoris stretch w/strap 30s 3 bilat   [email or web address] LOP (93 mmHg)  8 min 30-15-15-15  SLR flexion/sidelying hip abd/SAQ over bolster/LAQ at EOB   BOSU lunge isos fwd 5s 10 Fatigued quickly on her L.    Leg press  - 2 up SL down - 60# 2 10 Ecc focus; bilat   Wall sits - blue TB loop at knees      Standing hip abd isos against wall 5s 10 bilat   SL glut bridge 5s 10 bilat Lateral step down on 4\"  12 bilat   Lateral TB walks - yellow padded 4 30 feet Minisquatted                                       Manual Intervention (19357)      L patella mobs - medial glides  12 min Bilat; Gr. II-III   IASTM to distal rectus femoris, ITB, VL  6 min bilat                           NMR re-education (78590)   CUES NEEDED   [place]/Biofeedback 10/10      BFR      G. Med activation      Hip Ext full ROM/ G. Activation      Bosu Bal and Prop- G Med      Single leg stance/Balance/Prop      Bosu Retro G. Med act      Prone Hip froggers- sliders/elevated            Therapeutic Activity (89337)      Ladders      [employer]      Dynamic Balance                            Therapeutic Exercise and NMR EXR  [x] (41433) Provided verbal/tactile cueing for activities related to strengthening, flexibility, endurance, ROM for improvements in LE, proximal hip, and core control with self care, mobility, lifting, ambulation. [x] (30364) Provided verbal/tactile cueing for activities related to improving balance, coordination, kinesthetic sense, posture, motor skill, proprioception  to assist with LE, proximal hip, and core control in self care, mobility, lifting, ambulation and eccentric single leg control.      NMR and Therapeutic Activities:    [x] (50897 or 92011) Provided verbal/tactile cueing for activities related to improving balance, coordination, kinesthetic sense, posture, motor skill, proprioception and motor activation to allow for proper function of core, proximal hip and LE with self care and ADLs and functional mobility.   [] (56800) Gait Re-education- Provided training and instruction to the patient for proper LE, core and proximal hip recruitment and positioning and eccentric body weight control with ambulation re-education including up and down stairs     Home Exercise Program:    [x] (45134) Reviewed/Progressed HEP activities related to strengthening, flexibility, endurance, ROM of core, proximal hip and LE for functional self-care, mobility, lifting and ambulation/stair navigation   [] (34677)Reviewed/Progressed HEP activities related to improving balance, coordination, kinesthetic sense, posture, motor skill, proprioception of core, proximal hip and LE for self care, mobility, lifting, and ambulation/stair navigation      Manual Treatments:  PROM / STM / Oscillations-Mobs:  G-I, II, III, IV (PA's, Inf., Post.)  [x] (34703) Provided manual therapy to mobilize LE, proximal hip and/or LS spine soft tissue/joints for the purpose of modulating pain, promoting relaxation,  increasing ROM, reducing/eliminating soft tissue swelling/inflammation/restriction, improving soft tissue extensibility and allowing for proper ROM for normal function with self care, mobility, lifting and ambulation. Modalities:     [] GAME READY (VASO)- for significant edema, swelling, pain control. Charges:  Timed Code Treatment Minutes: 45   Total Treatment Minutes: 45      [] EVAL (LOW) 49982 (typically 20 minutes face-to-face)  [] EVAL (MOD) 06458 (typically 30 minutes face-to-face)  [] EVAL (HIGH) 27743 (typically 45 minutes face-to-face)  [] RE-EVAL     [x] CZ(22094) x 2    [] DRY NEEDLE 1 OR 2 MUSCLES  [] NMR (54873) x     [] DRY NEEDLE 3+ MUSCLES  [x] Manual (70257) x 1      [] TA (96436) x     [] Mech Traction (41000)  [] ES(attended) (57954)     [] ES (un) (55896):   [] VASO (26194)  [] Other:      GOALS:  Patient stated goal: Be able to lift without pain. [] Progressing: [] Met: [] Not Met: [] Adjusted  Therapist goals for Patient:   Short Term Goals: To be achieved in: 2 weeks  1. Independent in HEP and progression per patient tolerance, in order to prevent re-injury. [] Progressing: [] Met: [] Not Met: [] Adjusted  2. Patient will have a decrease in pain to facilitate improvement in movement, function, and ADLs as indicated by Functional Deficits. [] Progressing: [] Met: [] Not Met: [] Adjusted    Long Term Goals:  To be achieved in: 6-8 weeks  1. Disability index score of 5% or less for the LEFS to assist with reaching prior level of function. [] Progressing: [] Met: [] Not Met: [] Adjusted  2. Patient will demonstrate an increase in hip and quad strength and control, within 5# HHD in LE to allow for proper functional mobility as indicated by patients Functional Deficits. [] Progressing: [] Met: [] Not Met: [] Adjusted  3. Patient will be able to go up/down stairs with reciprocal mechanics without increased symptoms or restriction. [] Progressing: [] Met: [] Not Met: [] Adjusted  4. Patient will be able to squat and lunge without increased symptoms or restriction. [] Progressing: [] Met: [] Not Met: [] Adjusted     ASSESSMENT:  Positive response noted following previous session. Cont focus on addressing patellar mobility restrictions today with manual techniques and self stretching. Cont progression of quad and hip strength and motor control tasks within francisco knee flexion angles. Was able to push into deeper knee angles today. Will f/u about response at nv. Return to Play: (if applicable)   []  Stage 1: Intro to Strength   []  Stage 2: Return to Run and Strength   []  Stage 3: Return to Jump and Strength   []  Stage 4: Dynamic Strength and Agility   []  Stage 5: Sport Specific Training     []  Ready to Return to Play, Meets All Above Stages   []  Not Ready for Return to Sports   Comments:            Treatment/Activity Tolerance:  [x] Patient tolerated treatment well [] Patient limited by fatique  [] Patient limited by pain  [] Patient limited by other medical complications  [] Other:     Overall Progression Towards Functional goals/ Treatment Progress Update:  [x] Patient is progressing as expected towards functional goals listed. [] Progression is slowed due to complexities/Impairments listed. [] Progression has been slowed due to co-morbidities.   [] Plan just implemented, too soon to assess goals progression <30days [] Goals require adjustment due to lack of progress  [] Patient is not progressing as expected and requires additional follow up with physician  [] Other    Prognosis for POC: [x] Good [] Fair  [] Poor    Patient requires continued skilled intervention: [x] Yes  [] No        PLAN: 2x per week for 2-3 weeks  [x] Continue per plan of care [] Alter current plan (see comments)  [] Plan of care initiated [] Hold pending MD visit [] Discharge    Electronically signed by: Edward Barahona, PT, DPT, MS, SCS    Note: If patient does not return for scheduled/recommended follow up visits, this note will serve as a discharge from care along with the most recent update on progress.

## 2023-03-02 ENCOUNTER — HOSPITAL ENCOUNTER (OUTPATIENT)
Dept: PHYSICAL THERAPY | Age: 23
Setting detail: THERAPIES SERIES
Discharge: HOME | End: 2023-03-02
Payer: COMMERCIAL

## 2023-03-02 PROCEDURE — 97110 THERAPEUTIC EXERCISES: CPT

## 2023-03-02 PROCEDURE — 97140 MANUAL THERAPY 1/> REGIONS: CPT

## 2023-03-02 NOTE — FLOWSHEET NOTE
[place] [address] [address]  Phone: [phone] Fax: [phone]    Physical Therapy Treatment Note/ Progress Report:       Date:  2023    Patient Name:  Kristine Coronado    :  2000  MRN: [id number]  Restrictions/Precautions:    Medical/Treatment Diagnosis Information:   Diagnosis:  L knee pain   Treatment diagnosis:  M25.562 L knee pain; M22.22 Patellofemoral disorders of L knee       Insurance/Certification information:  A[employer]na - ded not met/no copay/auth   Physician Information:  Elis Sheth MD  Plan of care signed (Y/N):     Date of Patient follow up with Physician:      Progress Report: []  Yes  [x]  No     Date Range for reporting period:  Beginnin23  Endin days or 10 visits    Progress report due (10 Rx/or 30 days whichever is less):      Recertification due (POC duration/ or 90 days whichever is less): 3/14/23     Visit # Insurance Allowable Auth Needed   6 Aetna - 20 PT visits []Yes    [x]No     Latex Allergy:  [x]NO      []YES  Preferred Language for Healthcare:   [x]English       []other:  Functional Scale: 10% disability - LEFS (72/80)     Date assessed:23    Pain level:  0/10 at start of session. 0/10 at best. 5-6/10 at worst.      SUBJECTIVE:  Reports no soreness, pain in L kneecap after previous session. Feels like she tolerated advancements in strength activities well. Did work legs at the gym earlier this morning prior to today's session. OBJECTIVE: See eval  Observation:   Test measurements:      RESTRICTIONS/PRECAUTIONS:     Exercises/Interventions:     Therapeutic Ex (80953)  Sets/sec Reps Notes/CUES   Retro Stepper/BIKE      Half kneeling rectus femoris stretch w/strap 30s 3 [email or web address] LOP (108 mmHg)  8 min 30-15-15-15  Leg press - SL - 30#   BOSU lunge isos fwd 5s 10 Fatigued quickly on her L.    Leg press  - 2 up SL down - 60# 2 10 Ecc focus; bilat   Wall sits - blue TB loop at knees 20s 5    Standing hip abd isos against wall 5s 10 bilat   SL glut bridge on BOSU 5s 10 bilat   Lateral step down 2 12 Bilat; 4\" on the R   2\" on the L   Lateral TB walks - yellow padded 4 30 feet Minisquatted                                       Manual Intervention (37782)      L patella mobs - medial glides  12 min Bilat; Gr. II-III   IASTM to distal rectus femoris, ITB, VL  6 min bilat                           NMR re-education (75142)   CUES NEEDED   Croatian/Biofeedback 10/10      BFR      G. Med activation      Hip Ext full ROM/ G. Activation      Bosu Bal and Prop- G Med      Single leg stance/Balance/Prop      Bosu Retro G. Med act      Prone Hip froggers- sliders/elevated            Therapeutic Activity (26463)      Ladders      Plyos      Dynamic Balance                            Therapeutic Exercise and NMR EXR  [x] (37497) Provided verbal/tactile cueing for activities related to strengthening, flexibility, endurance, ROM for improvements in LE, proximal hip, and core control with self care, mobility, lifting, ambulation. [x] (80299) Provided verbal/tactile cueing for activities related to improving balance, coordination, kinesthetic sense, posture, motor skill, proprioception  to assist with LE, proximal hip, and core control in self care, mobility, lifting, ambulation and eccentric single leg control.      NMR and Therapeutic Activities:    [x] (21484 or 69054) Provided verbal/tactile cueing for activities related to improving balance, coordination, kinesthetic sense, posture, motor skill, proprioception and motor activation to allow for proper function of core, proximal hip and LE with self care and ADLs and functional mobility.   [] (27498) Gait Re-education- Provided training and instruction to the patient for proper LE, core and proximal hip recruitment and positioning and eccentric body weight control with ambulation re-education including up and down stairs     Home Exercise Program:    [x] (77249) Reviewed/Progressed HEP activities related to strengthening, flexibility, endurance, ROM of core, proximal hip and LE for functional self-care, mobility, lifting and ambulation/stair navigation   [] (91286)Reviewed/Progressed HEP activities related to improving balance, coordination, kinesthetic sense, posture, motor skill, proprioception of core, proximal hip and LE for self care, mobility, lifting, and ambulation/stair navigation      Manual Treatments:  PROM / STM / Oscillations-Mobs:  G-I, II, III, IV (PA's, Inf., Post.)  [x] (17123) Provided manual therapy to mobilize LE, proximal hip and/or LS spine soft tissue/joints for the purpose of modulating pain, promoting relaxation,  increasing ROM, reducing/eliminating soft tissue swelling/inflammation/restriction, improving soft tissue extensibility and allowing for proper ROM for normal function with self care, mobility, lifting and ambulation. Modalities:     [] GAME READY (VASO)- for significant edema, swelling, pain control. Charges:  Timed Code Treatment Minutes: 45   Total Treatment Minutes: 45      [] EVAL (LOW) 40581 (typically 20 minutes face-to-face)  [] EVAL (MOD) 50570 (typically 30 minutes face-to-face)  [] EVAL (HIGH) 58206 (typically 45 minutes face-to-face)  [] RE-EVAL     [x] JE(54394) x 2    [] DRY NEEDLE 1 OR 2 MUSCLES  [] NMR (15348) x     [] DRY NEEDLE 3+ MUSCLES  [x] Manual (56946) x 1      [] TA (77602) x     [] [facility] (49885)  [] ES(attended) (14993)     [] ES (un) (15275):   [] VASO (35857)  [] Other:      GOALS:  Patient stated goal: Be able to lift without pain. [] Progressing: [] Met: [] Not Met: [] Adjusted  Therapist goals for Patient:   Short Term Goals: To be achieved in: 2 weeks  1. Independent in HEP and progression per patient tolerance, in order to prevent re-injury. [] Progressing: [] Met: [] Not Met: [] Adjusted  2.  Patient will have a decrease in pain to facilitate improvement in movement, function, and ADLs as indicated by Functional Deficits. [] Progressing: [] Met: [] Not Met: [] Adjusted    Long Term Goals: To be achieved in: 6-8 weeks  1. Disability index score of 5% or less for the LEFS to assist with reaching prior level of function. [] Progressing: [] Met: [] Not Met: [] Adjusted  2. Patient will demonstrate an increase in hip and quad strength and control, within 5# HHD in LE to allow for proper functional mobility as indicated by patients Functional Deficits. [] Progressing: [] Met: [] Not Met: [] Adjusted  3. Patient will be able to go up/down stairs with reciprocal mechanics without increased symptoms or restriction. [] Progressing: [] Met: [] Not Met: [] Adjusted  4. Patient will be able to squat and lunge without increased symptoms or restriction. [] Progressing: [] Met: [] Not Met: [] Adjusted     ASSESSMENT:  Cont focus on addressing patellar mobility restrictions today with manual techniques and self stretching followed by progression of quad and hip strength and motor control tasks within tolerated knee flexion angles. Advanced BFR to performance with leg press to increase load tolerance, tissue strength, but did require limited range due to some discomfort with deeper knee flexion angles. Patient definitely has a major hip control component contributing to poor patellar tracking. Reports reproduction of sharp L knee pain with tasks like lateral step downs right at the depth where patient loses glut med control. Did refer patient to Dr. Karri Lerma for further work up just to ensure there is no other structural involvement in patient's pain sxs.     Return to Play: (if applicable)   []  Stage 1: Intro to Strength   []  Stage 2: Return to Run and Strength   []  Stage 3: Return to Jump and Strength   []  Stage 4: Dynamic Strength and Agility   []  Stage 5: Sport Specific Training     []  Ready to Return to Play, Meets All Above Stages   []  Not Ready for Return to Sports   Comments:            Treatment/Activity Tolerance:  [x] Patient tolerated treatment well [] Patient limited by fatique  [] Patient limited by pain  [] Patient limited by other medical complications  [] Other:     Overall Progression Towards Functional goals/ Treatment Progress Update:  [x] Patient is progressing as expected towards functional goals listed. [] Progression is slowed due to complexities/Impairments listed. [] Progression has been slowed due to co-morbidities. [] Plan just implemented, too soon to assess goals progression <30days   [] Goals require adjustment due to lack of progress  [] Patient is not progressing as expected and requires additional follow up with physician  [] Other    Prognosis for POC: [x] Good [] Fair  [] Poor    Patient requires continued skilled intervention: [x] Yes  [] No        PLAN: 2x per week for 2-3 weeks  [x] Continue per plan of care [] Alter current plan (see comments)  [] Plan of care initiated [] Hold pending MD visit [] Discharge    Electronically signed by: Martha Jaramillo, PT, DPT, MS, SCS    Note: If patient does not return for scheduled/recommended follow up visits, this note will serve as a discharge from care along with the most recent update on progress.

## 2023-03-07 ENCOUNTER — HOSPITAL ENCOUNTER (OUTPATIENT)
Dept: PHYSICAL THERAPY | Age: 23
Setting detail: THERAPIES SERIES
Discharge: HOME | End: 2023-03-07
Payer: COMMERCIAL

## 2023-03-07 PROCEDURE — 97140 MANUAL THERAPY 1/> REGIONS: CPT

## 2023-03-07 PROCEDURE — 97110 THERAPEUTIC EXERCISES: CPT

## 2023-03-07 NOTE — FLOWSHEET NOTE
[place] [address] [facility]  Phone: [phone] Fax: [phone]    Physical Therapy Treatment Note/ Progress Report:       Date:  2023    Patient Name:  Hunter Garza    :  2000  MRN: [id number]  Restrictions/Precautions:    Medical/Treatment Diagnosis Information:   Diagnosis:  L knee pain   Treatment diagnosis:  M25.562 L knee pain; M22.22 Patellofemoral disorders of L knee       Insurance/Certification information:  Aetna - ded not met/no copay/auth   Physician Information:  Edwardo Linn MD  Plan of care signed (Y/N):     Date of Patient follow up with Physician:      Progress Report: []  Yes  [x]  No     Date Range for reporting period:  Beginnin23  Endin days or 10 visits    Progress report due (10 Rx/or 30 days whichever is less):      Recertification due (POC duration/ or 90 days whichever is less): 3/14/23     Visit # Insurance Allowable Auth Needed   7 Aetna - 20 PT visits []Yes    [x]No     Latex Allergy:  [x]NO      []YES  Preferred Language for Healthcare:   [x]English       []other:  Functional Scale: 10% disability - LEFS (72/80)     Date assessed:3/7/23    Pain level:  0/10 at start of session. 0/10 at best. 5-6/10 at worst.      SUBJECTIVE:  Does not feel like her symptoms have really changed much. Feels like the frequency of her pain is the same. Still having pain with squatting and stairs. Has an appointment to see Dr. Glen Latif next Monday for work up. OBJECTIVE: See eval  Observation:   Test measurements:      RESTRICTIONS/PRECAUTIONS:     Exercises/Interventions:     Therapeutic Ex (57569)  Sets/sec Reps Notes/CUES   Retro Stepper/BIKE      Half kneeling rectus femoris stretch w/strap 30s 3 [email or web address] LOP (108 mmHg)  8 min 30-15-15-15  Leg press - SL - 30#   Prone flying squirrel - glut act 10s 10 Small knee lift   BOSU lunge isos fwd 5s 10 Fatigued quickly on her L. Leg press  - 2 up SL down - 60# 2 10 Ecc focus; bilat   Wall sits - blue TB loop at knees 20s 5    Standing hip abd isos against wall 5s 10 bilat   SL glut bridge on BOSU 5s 10 bilat   Lateral step down 2 12 Bilat; 4\" on the R   2\" on the L   Lateral TB walks - yellow padded 4 30 feet Minisquatted   Hip ext flexed over EOB 2 10 bilat   Leg press hip ext - 10# 2 10 bilat                           Manual Intervention (26511)      L patella mobs - medial glides  12 min Bilat; Gr. II-III   IASTM to distal rectus femoris, ITB, VL  6 min bilat                           NMR re-education (11734)   CUES NEEDED   [place]/Biofeedback 10/10      BFR      G. Med activation      Hip Ext full ROM/ G. Activation      Bosu Bal and Prop- G Med      Single leg stance/Balance/Prop      Bosu Retro G. Med act      Prone Hip froggers- sliders/elevated            Therapeutic Activity (21646)      Ladders      [employer]      Dynamic Balance                            Therapeutic Exercise and NMR EXR  [x] (70530) Provided verbal/tactile cueing for activities related to strengthening, flexibility, endurance, ROM for improvements in LE, proximal hip, and core control with self care, mobility, lifting, ambulation. [x] (47216) Provided verbal/tactile cueing for activities related to improving balance, coordination, kinesthetic sense, posture, motor skill, proprioception  to assist with LE, proximal hip, and core control in self care, mobility, lifting, ambulation and eccentric single leg control.      NMR and Therapeutic Activities:    [x] (46908 or 95610) Provided verbal/tactile cueing for activities related to improving balance, coordination, kinesthetic sense, posture, motor skill, proprioception and motor activation to allow for proper function of core, proximal hip and LE with self care and ADLs and functional mobility.   [] (94599) Gait Re-education- Provided training and instruction to the patient for proper LE, core and proximal hip recruitment and positioning and eccentric body weight control with ambulation re-education including up and down stairs     Home Exercise Program:    [x] (77181) Reviewed/Progressed HEP activities related to strengthening, flexibility, endurance, ROM of core, proximal hip and LE for functional self-care, mobility, lifting and ambulation/stair navigation   [] (90608)Reviewed/Progressed HEP activities related to improving balance, coordination, kinesthetic sense, posture, motor skill, proprioception of core, proximal hip and LE for self care, mobility, lifting, and ambulation/stair navigation      Manual Treatments:  PROM / STM / Oscillations-Mobs:  G-I, II, III, IV (PA's, Inf., Post.)  [x] (47295) Provided manual therapy to mobilize LE, proximal hip and/or LS spine soft tissue/joints for the purpose of modulating pain, promoting relaxation,  increasing ROM, reducing/eliminating soft tissue swelling/inflammation/restriction, improving soft tissue extensibility and allowing for proper ROM for normal function with self care, mobility, lifting and ambulation. Modalities:     [] GAME READY (VASO)- for significant edema, swelling, pain control. Charges:  Timed Code Treatment Minutes: 45   Total Treatment Minutes: 45      [] EVAL (LOW) 13542 (typically 20 minutes face-to-face)  [] EVAL (MOD) 50891 (typically 30 minutes face-to-face)  [] EVAL (HIGH) 41774 (typically 45 minutes face-to-face)  [] RE-EVAL     [x] VA(20112) x 2    [] DRY NEEDLE 1 OR 2 MUSCLES  [] NMR (42341) x     [] DRY NEEDLE 3+ MUSCLES  [x] Manual (51097) x 1      [] TA (37557) x     [] [facility] (90325)  [] ES(attended) (45022)     [] ES (un) (07837):   [] VASO (02408)  [] Other:      GOALS:  Patient stated goal: Be able to lift without pain. [] Progressing: [] Met: [] Not Met: [] Adjusted  Therapist goals for Patient:   Short Term Goals: To be achieved in: 2 weeks  1.  Independent in HEP and progression per patient tolerance, in order to prevent re-injury. [] Progressing: [] Met: [] Not Met: [] Adjusted  2. Patient will have a decrease in pain to facilitate improvement in movement, function, and ADLs as indicated by Functional Deficits. [] Progressing: [] Met: [] Not Met: [] Adjusted    Long Term Goals: To be achieved in: 6-8 weeks  1. Disability index score of 5% or less for the LEFS to assist with reaching prior level of function. [] Progressing: [] Met: [] Not Met: [] Adjusted  2. Patient will demonstrate an increase in hip and quad strength and control, within 5# HHD in LE to allow for proper functional mobility as indicated by patients Functional Deficits. [] Progressing: [] Met: [] Not Met: [] Adjusted  3. Patient will be able to go up/down stairs with reciprocal mechanics without increased symptoms or restriction. [] Progressing: [] Met: [] Not Met: [] Adjusted  4. Patient will be able to squat and lunge without increased symptoms or restriction. [] Progressing: [] Met: [] Not Met: [] Adjusted     ASSESSMENT:  Patient does not really feel like she has seen any major change in her symptoms. Still having difficulty, discomfort with squatting and stairs with the same frequency as when she first started PT. This is consistent with no change in her LEFS score. Patient's patellar mobility is still very restricted and patient is still very tender to vastus lateralis, distal rectus femoris, and ITB. Do think their is a bigger hip component in patient's knee pain sxs than previously thought, so greater emphasis on glut activation and strengthening today to assess change in sxs. Did refer patient to Dr. Tenisha Barnes for further work up just to ensure there is no other structural involvement in patient's pain sxs. Will see Dr. Tenisha Barnes next Monday.      Return to Play: (if applicable)   []  Stage 1: Intro to Strength   []  Stage 2: Return to Run and Strength   []  Stage 3: Return to Jump and Strength   []  Stage 4: Dynamic Strength and Agility   []  Stage 5: Sport Specific Training     []  Ready to Return to Play, [employer]   []  Not Ready for Return to Sports   Comments:            Treatment/Activity Tolerance:  [x] Patient tolerated treatment well [] Patient limited by fatique  [] Patient limited by pain  [] Patient limited by other medical complications  [] Other:     Overall Progression Towards Functional goals/ Treatment Progress Update:  [x] Patient is progressing as expected towards functional goals listed. [] Progression is slowed due to complexities/Impairments listed. [] Progression has been slowed due to co-morbidities. [] Plan just implemented, too soon to assess goals progression <30days   [] Goals require adjustment due to lack of progress  [] Patient is not progressing as expected and requires additional follow up with physician  [] Other    Prognosis for POC: [x] Good [] Fair  [] Poor    Patient requires continued skilled intervention: [x] Yes  [] No        PLAN: 2x per week for 2-3 weeks  [x] Continue per plan of care [] Alter current plan (see comments)  [] Plan of care initiated [] Hold pending MD visit [] Discharge    Electronically signed by: Aurora Greco, PT, DPT, MS, SCS    Note: If patient does not return for scheduled/recommended follow up visits, this note will serve as a discharge from care along with the most recent update on progress.

## 2023-03-09 ENCOUNTER — HOSPITAL ENCOUNTER (OUTPATIENT)
Dept: PHYSICAL THERAPY | Age: 23
Setting detail: THERAPIES SERIES
Discharge: HOME | End: 2023-03-09
Payer: COMMERCIAL

## 2023-03-09 PROCEDURE — 97110 THERAPEUTIC EXERCISES: CPT

## 2023-03-09 PROCEDURE — 97140 MANUAL THERAPY 1/> REGIONS: CPT

## 2023-03-09 NOTE — FLOWSHEET NOTE
[place] [address] [place]  Phone: [phone] Fax: [phone]    Physical Therapy Treatment Note/ Progress Report:       Date:  2023    Patient Name:  Gi Sheridan    :  2000  MRN: [id number]  Restrictions/Precautions:    Medical/Treatment Diagnosis Information:   Diagnosis:  L knee pain   Treatment diagnosis:  M25.562 L knee pain; M22.22 Patellofemoral disorders of L knee       Insurance/Certification information:  Aetna - ded not met/no copay/auth   Physician Information:  Alfredo Ann MD  Plan of care signed (Y/N):     Date of Patient follow up with Physician:      Progress Report: []  Yes  [x]  No     Date Range for reporting period:  Beginnin23  Endin days or 10 visits    Progress report due (10 Rx/or 30 days whichever is less): 67     Recertification due (POC duration/ or 90 days whichever is less): 3/14/23     Visit # Insurance Allowable Auth Needed   8 Aetna - 20 PT visits []Yes    [x]No     Latex Allergy:  [x]NO      []YES  Preferred Language for Healthcare:   [x]English       []other:  Functional Scale: 10% disability - LEFS (72/80)     Date assessed:3/7/23    Pain level:  0/10 at start of session. 0/10 at best. 5-6/10 at worst.      SUBJECTIVE:  Reports no pain in either knee following previous session. States that this is the first time she has actually felt like she left PT session and didn't notice any pain with going up and down stairs, squatting, etc.    OBJECTIVE: See eval  Observation:   Test measurements:      RESTRICTIONS/PRECAUTIONS:     Exercises/Interventions:     Therapeutic Ex (94921)  Sets/sec Reps Notes/CUES   Retro Stepper/BIKE      Half kneeling rectus femoris stretch w/strap 30s 3 jung [email or web address] LOP (108 mmHg)  8 min 30-15-15-15  Leg press - SL - 30#   Prone flying squirrel - glut act 10s 10 Small knee lift   BOSU lunge isos fwd 5s 10 Fatigued quickly on her L. Leg press  - 2 up SL down - 80# 2 15 Ecc focus; bilat   Wall sits - blue TB loop at knees 20s 5    Standing hip abd isos against wall 5s 10 bilat   SL glut bridge on BOSU 5s 10 bilat   Lateral step down 2 12 Bilat; 4\" on the R   2\" on the L   Lateral TB walks - yellow padded 4 30 feet Minisquatted   Hip ext flexed over EOB 2 10 bilat   Leg press hip ext - 10# 2 10 bilat                           Manual Intervention (26296)      L patella mobs - medial glides  12 min Bilat; Gr. II-III   IASTM to distal rectus femoris, ITB, VL  6 min bilat                           NMR re-education (22994)   CUES NEEDED   Finnish/Biofeedback 10/10      BFR      G. Med activation      Hip Ext full ROM/ G. Activation      Bosu Bal and Prop- G Med      Single leg stance/Balance/Prop      Bosu Retro G. Med act      Prone Hip froggers- sliders/elevated            Therapeutic Activity (91445)      Ladders      BUKAos      Dynamic Balance                            Therapeutic Exercise and NMR EXR  [x] (63072) Provided verbal/tactile cueing for activities related to strengthening, flexibility, endurance, ROM for improvements in LE, proximal hip, and core control with self care, mobility, lifting, ambulation. [x] (19852) Provided verbal/tactile cueing for activities related to improving balance, coordination, kinesthetic sense, posture, motor skill, proprioception  to assist with LE, proximal hip, and core control in self care, mobility, lifting, ambulation and eccentric single leg control.      NMR and Therapeutic Activities:    [x] (71972 or 27851) Provided verbal/tactile cueing for activities related to improving balance, coordination, kinesthetic sense, posture, motor skill, proprioception and motor activation to allow for proper function of core, proximal hip and LE with self care and ADLs and functional mobility.   [] (47938) Gait Re-education- Provided training and instruction to the patient for proper LE, core and proximal hip recruitment and positioning and eccentric body weight control with ambulation re-education including up and down stairs     Home Exercise Program:    [x] (12359) Reviewed/Progressed HEP activities related to strengthening, flexibility, endurance, ROM of core, proximal hip and LE for functional self-care, mobility, lifting and ambulation/stair navigation   [] (14364)Reviewed/Progressed HEP activities related to improving balance, coordination, kinesthetic sense, posture, motor skill, proprioception of core, proximal hip and LE for self care, mobility, lifting, and ambulation/stair navigation      Manual Treatments:  PROM / STM / Oscillations-Mobs:  G-I, II, III, IV (PA's, Inf., Post.)  [x] (17093) Provided manual therapy to mobilize LE, proximal hip and/or LS spine soft tissue/joints for the purpose of modulating pain, promoting relaxation,  increasing ROM, reducing/eliminating soft tissue swelling/inflammation/restriction, improving soft tissue extensibility and allowing for proper ROM for normal function with self care, mobility, lifting and ambulation. Modalities:     [] GAME READY (VASO)- for significant edema, swelling, pain control. Charges:  Timed Code Treatment Minutes: 52   Total Treatment Minutes: 52      [] EVAL (LOW) 66359 (typically 20 minutes face-to-face)  [] EVAL (MOD) 78483 (typically 30 minutes face-to-face)  [] EVAL (HIGH) 10343 (typically 45 minutes face-to-face)  [] RE-EVAL     [x] SE(73689) x 2    [] DRY NEEDLE 1 OR 2 MUSCLES  [] NMR (23669) x     [] DRY NEEDLE 3+ MUSCLES  [x] Manual (57627) x 1      [] TA (74547) x     [] Mech Traction (95931)  [] ES(attended) (97906)     [] ES (un) (91902):   [] VASO (93979)  [] Other:      GOALS:  Patient stated goal: Be able to lift without pain. [] Progressing: [] Met: [] Not Met: [] Adjusted  Therapist goals for Patient:   Short Term Goals: To be achieved in: 2 weeks  1.  Independent in HEP and progression per patient tolerance, in order to prevent re-injury.   [] Progressing: [] Met: [] Not Met: [] Adjusted  2. Patient will have a decrease in pain to facilitate improvement in movement, function, and ADLs as indicated by Functional Deficits.  [] Progressing: [] Met: [] Not Met: [] Adjusted    Long Term Goals: To be achieved in: 6-8 weeks  1. Disability index score of 5% or less for the LEFS to assist with reaching prior level of function.   [] Progressing: [] Met: [] Not Met: [] Adjusted  2. Patient will demonstrate an increase in hip and quad strength and control, within 5# HHD in LE to allow for proper functional mobility as indicated by patients Functional Deficits.   [] Progressing: [] Met: [] Not Met: [] Adjusted  3. Patient will be able to go up/down stairs with reciprocal mechanics without increased symptoms or restriction.   [] Progressing: [] Met: [] Not Met: [] Adjusted  4. Patient will be able to squat and lunge without increased symptoms or restriction.  [] Progressing: [] Met: [] Not Met: [] Adjusted     ASSESSMENT:  Cont session focus on glut activation and strengthening today due to positive response in knee pain sxs with functional tasks like stairs and squatting following previous session. Will see Dr. Chun next Monday.     Return to Play: (if applicable)   []  Stage 1: Intro to Strength   []  Stage 2: Return to Run and Strength   []  Stage 3: Return to Jump and Strength   []  Stage 4: Dynamic Strength and Agility   []  Stage 5: Sport Specific Training     []  Ready to Return to Play, Meets All Above Stages   []  Not Ready for Return to Sports   Comments:            Treatment/Activity Tolerance:  [x] Patient tolerated treatment well [] Patient limited by fatique  [] Patient limited by pain  [] Patient limited by other medical complications  [] Other:     Overall Progression Towards Functional goals/ Treatment Progress Update:  [x] Patient is progressing as expected towards functional goals listed.    [] Progression is slowed due to  complexities/Impairments listed. [] Progression has been slowed due to co-morbidities. [] Plan just implemented, too soon to assess goals progression <30days   [] Goals require adjustment due to lack of progress  [] Patient is not progressing as expected and requires additional follow up with physician  [] Other    Prognosis for POC: [x] Good [] Fair  [] Poor    Patient requires continued skilled intervention: [x] Yes  [] No        PLAN: 2x per week for 2-3 weeks  [x] Continue per plan of care [] Alter current plan (see comments)  [] Plan of care initiated [] Hold pending MD visit [] Discharge    Electronically signed by: Rodolfo Barnes, PT, DPT, MS, SCS    Note: If patient does not return for scheduled/recommended follow up visits, this note will serve as a discharge from care along with the most recent update on progress.

## 2023-03-13 ENCOUNTER — OFFICE VISIT (OUTPATIENT)
Dept: ORTHOPEDIC SURGERY | Age: 23
End: 2023-03-13
Payer: COMMERCIAL

## 2023-03-13 VITALS — WEIGHT: 133 LBS | HEIGHT: 66 IN | BODY MASS INDEX: 21.38 KG/M2

## 2023-03-13 DIAGNOSIS — M25.562 LEFT KNEE PAIN, UNSPECIFIED CHRONICITY: Primary | ICD-10-CM

## 2023-03-13 PROCEDURE — 99204 OFFICE O/P NEW MOD 45 MIN: CPT | Performed by: EMERGENCY MEDICINE

## 2023-03-13 ASSESSMENT — ENCOUNTER SYMPTOMS
ABDOMINAL PAIN: 0
SHORTNESS OF BREATH: 0
RHINORRHEA: 0

## 2023-03-13 NOTE — PROGRESS NOTES
NEW PATIENT VISIT  CC: Knee Pain (LEFT KNEE)    Referring Provider: [address] [place] [address] physical therapy    HPI:    Francesco Gupta is a 26 y.o. female who presents for evaluation of left knee pain. Patient reports several years of symptoms. 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 She states that she has had intermittent episodes of left knee pain. This is particularly worse with mid range squats. She has been in physical therapy. She states that she is getting a little bit better. However, slow progression. Therefore, she was referred by physical therapy to my office for further work-up and evaluation. Patient denies any swelling. She does report that she has occasional feelings of giving out. She denies any other complaints. History reviewed. No pertinent past medical history. Social History  Student at [employer] and [employer]    Medications  No current outpatient medications on file. No current facility-administered medications for this visit. Allergies  No Known Allergies    Review of Systems:  Review of Systems   Constitutional:  Negative for activity change. HENT:  Negative for rhinorrhea. Respiratory:  Negative for shortness of breath. Cardiovascular:  Negative for chest pain. Gastrointestinal:  Negative for abdominal pain. Musculoskeletal:  Negative for gait problem, joint swelling and myalgias. Left knee pain   Skin:  Negative for rash. Allergic/Immunologic: Negative for immunocompromised state. Neurological:  Negative for numbness.      Physical Examination:  General: Well appearing female, in no acute distress  Respiratory: Normal respiratory effort  Cardiovascular: No visual or palpable edema  Skin: no identified rashes, no induration, erythema or cyanosis  Neurologic: Light touch sensation is intact, no allodynia or hyperalgesia  Gait: Normal gait and station  Extremities: No evidence of clubbing, cyanosis, tenosynovitis or nail pitting  MSK:  Left knee  Inspection/Palpation: Mild tenderness to palpation over the lateral joint line and Gerdy's tubercle  ROM: Full range of motion  Stability: No ligamentous instability at 0 and 30 degrees flexion with valgus and varus stress  Strength/Tone: 5/5, normal tone  Special Tests: Positive lateral Jenny's, negative medial Jenny's, negative anterior and posterior drawers, negative Jo Ann's    Radiology:  4 view X-rays of the left knee dated 3/13/2023 were reviewed independently and discussed with the patient. The films revealed: Lateral riding patella bilaterally, no acute osseous abnormalities    Assessment/Treatment Plan: Nilson Freeman is a 26 y.o. female with:    1. Left knee pain, unspecified chronicity  -     XR KNEE LEFT (MIN 4 VIEWS); Future  -     MRI KNEE LEFT WO CONTRAST; Future    Patient is seen and examined in the office today. She is presenting with left knee pain, particularly worse with mid range of motion squatting. She has mild tenderness to palpation over the lateral joint line with positive lateral Jenny's. X-ray reveals laterally tilting patella bilaterally. Differential diagnosis includes IT band syndrome, patellofemoral dysfunction, and/or lateral meniscus tear. With this, and per discussion with patient, and given failing a course of outpatient physical therapy, we will proceed with MRI left knee. She will follow-up in 1 week for MRI review. Continue physical therapy. We discussed Aleve or Advil or topical Voltaren for symptom control.     Orders Placed This Encounter   Procedures    XR KNEE LEFT (MIN 4 VIEWS)     Standing Status:   Future     Number of Occurrences:   1     Standing Expiration Date:   4/13/2023     Order Specific Question:   Reason for exam:     Answer:   Pain    MRI KNEE LEFT WO CONTRAST     Standing Status:   Future     Standing Expiration Date:   6/13/2023     Scheduling Instructions:      Pro Guillermina Vargas Order Specific Question:   Reason for exam:     Answer:   Left Knee Pain       Follow-up:   Return in about 1 week (around 3/20/2023) for MRI review. Sooner with any problems, questions, concerns, or worsening symptoms. Electronically signed by Kyle Taylor MD on 3/13/2023 at 9:25 AM.    Disclaimer: This note was dictated with voice recognition software. Though review and correction are routine, we apologize for any errors.

## 2023-03-14 ENCOUNTER — HOSPITAL ENCOUNTER (OUTPATIENT)
Dept: PHYSICAL THERAPY | Age: 23
Setting detail: THERAPIES SERIES
Discharge: HOME | End: 2023-03-14
Payer: COMMERCIAL

## 2023-03-14 PROCEDURE — 97140 MANUAL THERAPY 1/> REGIONS: CPT

## 2023-03-14 PROCEDURE — 97110 THERAPEUTIC EXERCISES: CPT

## 2023-03-14 NOTE — FLOWSHEET NOTE
[facility] [address] [facility]  Phone: [phone] Fax: [phone]    Physical Therapy Treatment Note/ Progress Report:       Date:  2023    Patient Name:  Selam Guerra    :  2000  MRN: [id number]  Restrictions/Precautions:    Medical/Treatment Diagnosis Information:   Diagnosis:  L knee pain   Treatment diagnosis:  M25.562 L knee pain; M22.22 Patellofemoral disorders of L knee       Insurance/Certification information:  Aetna - ded not met/no copay/auth   Physician Information:  Pete Spence MD  Plan of care signed (Y/N):     Date of Patient follow up with Physician:      Progress Report: []  Yes  [x]  No     Date Range for reporting period:  Beginnin23  Endin days or 10 visits    Progress report due (10 Rx/or 30 days whichever is less):      Recertification due (POC duration/ or 90 days whichever is less): 3/14/23     Visit # Insurance Allowable Auth Needed   9 Aetna - 20 PT visits []Yes    [x]No     Latex Allergy:  [x]NO      []YES  Preferred Language for Healthcare:   [x]English       []other:  Functional Scale: 10% disability - LEFS (72/80)     Date assessed:3/7/23    Pain level:  0/10 at start of session. 0/10 at best. 5-6/10 at worst.      SUBJECTIVE:  Saw Dr. Abhijeet Godfrey for consultation yesterday. Dr. Abhijeet Godfrey concerned about possible meniscus tear, so ordered an MRI of the L knee. Dr. Abhijeet Godfrey did feel like patient's kneecaps appeared tilted laterally on x-rays. OBJECTIVE: See eval  Observation:   Test measurements:      RESTRICTIONS/PRECAUTIONS:     Exercises/Interventions:     Therapeutic Ex (27394)  Sets/sec Reps Notes/CUES   Retro Stepper/BIKE      Half kneeling rectus femoris stretch w/strap 30s 3 jung [email or web address] LOP (108 mmHg)  8 min 30-15-15-15  Leg press - SL - 30#   Prone flying squirrel - glut act 10s 10 Small knee lift   BOSU lunge isos fwd 5s 10 Fatigued quickly on her L.    Leg press  - 2 up SL down - 80# 2 15 Ecc focus; Raisa Reyes sits - blue TB loop at knees 20s 5    Standing hip abd isos against wall 5s 10 bilat   SL glut bridge on BOSU 5s 10 bilat   Lateral step down 2 12 Bilat   Lateral TB walks - yellow padded 4 30 feet Minisquatted   Hip ext flexed over EOB - 3# 2 10 bilat   Leg press hip ext - 10# 2 10 bilat   Reverse lunge w/slider 1 12 bilat   SL multi-directional hip - yellow padded 3 5 bilat               Manual Intervention (63588)      L patella mobs - medial glides  12 min Bilat; Gr. II-III   IASTM to distal rectus femoris, ITB, VL  6 min bilat                           NMR re-education (61677)   CUES NEEDED   Vincentian/Biofeedback 10/10      BFR      G. Med activation      Hip Ext full ROM/ G. Activation      Bosu Bal and Prop- G Med      Single leg stance/Balance/Prop      Bosu Retro G. Med act      Prone Hip froggers- sliders/elevated            Therapeutic Activity (71755)      [employer]      [employer]      Dynamic Balance                            Therapeutic Exercise and NMR EXR  [x] (64597) Provided verbal/tactile cueing for activities related to strengthening, flexibility, endurance, ROM for improvements in LE, proximal hip, and core control with self care, mobility, lifting, ambulation. [x] (40212) Provided verbal/tactile cueing for activities related to improving balance, coordination, kinesthetic sense, posture, motor skill, proprioception  to assist with LE, proximal hip, and core control in self care, mobility, lifting, ambulation and eccentric single leg control.      NMR and Therapeutic Activities:    [x] (27577 or 33336) Provided verbal/tactile cueing for activities related to improving balance, coordination, kinesthetic sense, posture, motor skill, proprioception and motor activation to allow for proper function of core, proximal hip and LE with self care and ADLs and functional mobility.   [] (98081) Gait Re-education- Provided training and instruction to the patient for proper LE, core and proximal hip recruitment and positioning and eccentric body weight control with ambulation re-education including up and down stairs     Home Exercise Program:    [x] (19407) Reviewed/Progressed HEP activities related to strengthening, flexibility, endurance, ROM of core, proximal hip and LE for functional self-care, mobility, lifting and ambulation/stair navigation   [] (07270)Reviewed/Progressed HEP activities related to improving balance, coordination, kinesthetic sense, posture, motor skill, proprioception of core, proximal hip and LE for self care, mobility, lifting, and ambulation/stair navigation      Manual Treatments:  PROM / STM / Oscillations-Mobs:  G-I, II, III, IV (PA's, Inf., Post.)  [x] (10788) Provided manual therapy to mobilize LE, proximal hip and/or LS spine soft tissue/joints for the purpose of modulating pain, promoting relaxation,  increasing ROM, reducing/eliminating soft tissue swelling/inflammation/restriction, improving soft tissue extensibility and allowing for proper ROM for normal function with self care, mobility, lifting and ambulation. Modalities:     [] GAME READY (VASO)- for significant edema, swelling, pain control. Charges:  Timed Code Treatment Minutes: 52   Total Treatment Minutes: 52      [] EVAL (LOW) 69220 (typically 20 minutes face-to-face)  [] EVAL (MOD) 70297 (typically 30 minutes face-to-face)  [] EVAL (HIGH) 78465 (typically 45 minutes face-to-face)  [] RE-EVAL     [x] GL(47172) x 2    [] DRY NEEDLE 1 OR 2 MUSCLES  [] NMR (83299) x     [] DRY NEEDLE 3+ MUSCLES  [x] Manual (15994) x 1      [] TA (99373) x     [] Mech Traction (59821)  [] ES(attended) (56020)     [] ES (un) (73585):   [] VASO (91123)  [] Other:      GOALS:  Patient stated goal: Be able to lift without pain. [] Progressing: [] Met: [] Not Met: [] Adjusted  Therapist goals for Patient:   Short Term Goals: To be achieved in: 2 weeks  1.  Independent in HEP and progression per patient tolerance, in order to prevent re-injury. [] Progressing: [] Met: [] Not Met: [] Adjusted  2. Patient will have a decrease in pain to facilitate improvement in movement, function, and ADLs as indicated by Functional Deficits. [] Progressing: [] Met: [] Not Met: [] Adjusted    Long Term Goals: To be achieved in: 6-8 weeks  1. Disability index score of 5% or less for the LEFS to assist with reaching prior level of function. [] Progressing: [] Met: [] Not Met: [] Adjusted  2. Patient will demonstrate an increase in hip and quad strength and control, within 5# HHD in LE to allow for proper functional mobility as indicated by patients Functional Deficits. [] Progressing: [] Met: [] Not Met: [] Adjusted  3. Patient will be able to go up/down stairs with reciprocal mechanics without increased symptoms or restriction. [] Progressing: [] Met: [] Not Met: [] Adjusted  4. Patient will be able to squat and lunge without increased symptoms or restriction. [] Progressing: [] Met: [] Not Met: [] Adjusted     ASSESSMENT:  Saw Dr. Fantasma Toledo for consultation yesterday. Dr. Fantasma Toledo concerned about possible meniscus tear, so ordered an MRI of the L knee to rule that out. Dr. Fantasma Toledo did feel like patient's kneecaps appeared laterally tilted on x-rays as well. Cont session focus on glut and knee activation and motor control tasks today focusing on progression of functional tasks like stairs and squatting. Patient actually able to tolerate lateral step downs on the 4\" step today pain free on the L knee and she was even able to maintain deeper knee flexion angles with squatting and lunging variations. Seeing good progress, but needs cont work.      Return to Play: (if applicable)   []  Stage 1: Intro to Strength   []  Stage 2: Return to Run and Strength   []  Stage 3: Return to Jump and Strength   []  Stage 4: Dynamic Strength and Agility   []  Stage 5: Sport Specific Training     []  Ready to Return to Play, Meets All Above Stages   []  Not Ready for Return to Sports   Comments:            Treatment/Activity Tolerance:  [x] Patient tolerated treatment well [] Patient limited by fatique  [] Patient limited by pain  [] Patient limited by other medical complications  [] Other:     Overall Progression Towards Functional goals/ Treatment Progress Update:  [x] Patient is progressing as expected towards functional goals listed. [] Progression is slowed due to complexities/Impairments listed. [] Progression has been slowed due to co-morbidities. [] Plan just implemented, too soon to assess goals progression <30days   [] Goals require adjustment due to lack of progress  [] Patient is not progressing as expected and requires additional follow up with physician  [] Other    Prognosis for POC: [x] Good [] Fair  [] Poor    Patient requires continued skilled intervention: [x] Yes  [] No        PLAN: 2x per week for 2-3 weeks  [x] Continue per plan of care [] Alter current plan (see comments)  [] Plan of care initiated [] Hold pending MD visit [] Discharge    Electronically signed by: Bethanie Saint, PT, DPT, MS, SCS    Note: If patient does not return for scheduled/recommended follow up visits, this note will serve as a discharge from care along with the most recent update on progress.

## 2023-03-15 ENCOUNTER — HOSPITAL ENCOUNTER (OUTPATIENT)
Dept: PHYSICAL THERAPY | Age: 23
Setting detail: THERAPIES SERIES
Discharge: HOME | End: 2023-03-15
Payer: COMMERCIAL

## 2023-03-15 PROCEDURE — 97140 MANUAL THERAPY 1/> REGIONS: CPT

## 2023-03-15 PROCEDURE — 97110 THERAPEUTIC EXERCISES: CPT

## 2023-03-15 NOTE — FLOWSHEET NOTE
[place] [address] [facility] [address]  Phone: [phone] Fax: [phone]    Physical Therapy Treatment Note/ Progress Report:       Date:  03/15/2023    Patient Name:  Estrella Hair    :  2000  MRN: [id number]  Restrictions/Precautions:    Medical/Treatment Diagnosis Information:   Diagnosis:  L knee pain   Treatment diagnosis:  M25.562 L knee pain; M22.22 Patellofemoral disorders of L knee       Insurance/Certification information:  Aetna - ded not met/no copay/auth   Physician Information:  Jens Zhang MD  Plan of care signed (Y/N):     Date of Patient follow up with Physician:      Progress Report: []  Yes  [x]  No     Date Range for reporting period:  Beginnin23  Endin days or 10 visits    Progress report due (10 Rx/or 30 days whichever is less): 8/10/37     Recertification due (POC duration/ or 90 days whichever is less): 3/14/23     Visit # Insurance Allowable Auth Needed   10 Aetna - 20 PT visits []Yes    [x]No     Latex Allergy:  [x]NO      []YES  Preferred Language for Healthcare:   [x]English       []other:  Functional Scale: 10% disability - LEFS (72/80)     Date assessed:3/7/23    Pain level:  0/10 at start of session. 0/10 at best. 5-6/10 at worst.      SUBJECTIVE:  MRI of L knee scheduled for 2 weeks from now when she gets back from [place] [employer]. OBJECTIVE: See eval  Observation:   Test measurements:      RESTRICTIONS/PRECAUTIONS:     Exercises/Interventions:     Therapeutic Ex (34791)  Sets/sec Reps Notes/CUES   Retro Stepper/BIKE      Half kneeling rectus femoris stretch w/strap 30s 3 bilat   [email or web address] LOP (108 mmHg)  8 min 30-15-15-15  Leg press - SL - 30#   Prone flying squirrel - glut act 10s 10 Small knee lift   BOSU lunge isos fwd 5s 10 Fatigued quickly on her L.    Leg press  - 2 up SL down - 80# 2 15 Ecc focus; bilat   Wall sits - blue TB loop at knees 20s 5    Standing hip abd isos against wall 5s 10 bilat   SL glut bridge on BOSU 5s 10 bilat   Lateral step down on 4\" 2 12 Bilat   Lateral TB walks - yellow padded 4 30 feet Minisquatted   Hip ext flexed over EOB - 3# 2 10 bilat   Leg press hip ext - 10# 2 10 bilat   Reverse lunge w/slider 1 12 bilat   SL multi-directional hip - yellow padded 3 5 bilat               Manual Intervention (08446)      L patella mobs - medial glides  12 min Bilat; Gr. II-III   IASTM to distal rectus femoris, ITB, VL  6 min bilat                           NMR re-education (70084)   CUES NEEDED   Vietnamese/Biofeedback 10/10      BFR      G. Med activation      Hip Ext full ROM/ G. Activation      Bosu Bal and Prop- G Med      Single leg stance/Balance/Prop      Bosu Retro G. Med act      Prone Hip froggers- sliders/elevated            Therapeutic Activity (86613)      Ladders      "[employer]os      Dynamic Balance                            Therapeutic Exercise and NMR EXR  [x] (97203) Provided verbal/tactile cueing for activities related to strengthening, flexibility, endurance, ROM for improvements in LE, proximal hip, and core control with self care, mobility, lifting, ambulation. [x] (87049) Provided verbal/tactile cueing for activities related to improving balance, coordination, kinesthetic sense, posture, motor skill, proprioception  to assist with LE, proximal hip, and core control in self care, mobility, lifting, ambulation and eccentric single leg control.      NMR and Therapeutic Activities:    [x] (76296 or 51532) Provided verbal/tactile cueing for activities related to improving balance, coordination, kinesthetic sense, posture, motor skill, proprioception and motor activation to allow for proper function of core, proximal hip and LE with self care and ADLs and functional mobility.   [] (09713) Gait Re-education- Provided training and instruction to the patient for proper LE, core and proximal hip recruitment and positioning and eccentric body weight control with ambulation re-education including up and down stairs     Home Exercise Program:    [x] (10528) Reviewed/Progressed HEP activities related to strengthening, flexibility, endurance, ROM of core, proximal hip and LE for functional self-care, mobility, lifting and ambulation/stair navigation   [] (44645)Reviewed/Progressed HEP activities related to improving balance, coordination, kinesthetic sense, posture, motor skill, proprioception of core, proximal hip and LE for self care, mobility, lifting, and ambulation/stair navigation      Manual Treatments:  PROM / STM / Oscillations-Mobs:  G-I, II, III, IV (PA's, Inf., Post.)  [x] (92567) Provided manual therapy to mobilize LE, proximal hip and/or LS spine soft tissue/joints for the purpose of modulating pain, promoting relaxation,  increasing ROM, reducing/eliminating soft tissue swelling/inflammation/restriction, improving soft tissue extensibility and allowing for proper ROM for normal function with self care, mobility, lifting and ambulation. Modalities:     [] GAME READY (VASO)- for significant edema, swelling, pain control. Charges:  Timed Code Treatment Minutes: 52   Total Treatment Minutes: 52      [] EVAL (LOW) 92660 (typically 20 minutes face-to-face)  [] EVAL (MOD) 50421 (typically 30 minutes face-to-face)  [] EVAL (HIGH) 90727 (typically 45 minutes face-to-face)  [] RE-EVAL     [x] IQ(67891) x 2    [] DRY NEEDLE 1 OR 2 MUSCLES  [] NMR (37941) x     [] DRY NEEDLE 3+ MUSCLES  [x] Manual (45495) x 1      [] TA (23189) x     [] [facility] Traction (45127)  [] ES(attended) (01470)     [] ES (un) (41840):   [] VASO (94898)  [] Other:      GOALS:  Patient stated goal: Be able to lift without pain. [] Progressing: [] Met: [] Not Met: [] Adjusted  Therapist goals for Patient:   Short Term Goals: To be achieved in: 2 weeks  1. Independent in HEP and progression per patient tolerance, in order to prevent re-injury. [] Progressing: [] Met: [] Not Met: [] Adjusted  2. Patient will have a decrease in pain to facilitate improvement in movement, function, and ADLs as indicated by Functional Deficits. [] Progressing: [] Met: [] Not Met: [] Adjusted    Long Term Goals: To be achieved in: 6-8 weeks  1. Disability index score of 5% or less for the LEFS to assist with reaching prior level of function. [] Progressing: [] Met: [] Not Met: [] Adjusted  2. Patient will demonstrate an increase in hip and quad strength and control, within 5# HHD in LE to allow for proper functional mobility as indicated by patients Functional Deficits. [] Progressing: [] Met: [] Not Met: [] Adjusted  3. Patient will be able to go up/down stairs with reciprocal mechanics without increased symptoms or restriction. [] Progressing: [] Met: [] Not Met: [] Adjusted  4. Patient will be able to squat and lunge without increased symptoms or restriction. [] Progressing: [] Met: [] Not Met: [] Adjusted     ASSESSMENT:  L knee MRI scheduled for the week patient gets back from Spring Break. Cont session focus on glut and quad activation and motor control tasks emphasizing progression of functional tasks like stairs and squatting working into deeper knee flexion angles as tolerated. Seeing good progress, but needs cont work. Return to Play: (if applicable)   []  Stage 1: Intro to Strength   []  Stage 2: Return to Run and Strength   []  Stage 3: Return to Jump and Strength   []  Stage 4: Dynamic Strength and Agility   []  Stage 5: Sport Specific Training     []  Ready to Return to Play, Meets All Above Stages   []  Not Ready for Return to Sports   Comments:            Treatment/Activity Tolerance:  [x] Patient tolerated treatment well [] Patient limited by fatique  [] Patient limited by pain  [] Patient limited by other medical complications  [] Other:     Overall Progression Towards Functional goals/ Treatment Progress Update:  [x] Patient is progressing as expected towards functional goals listed.     [] Progression is slowed due to complexities/Impairments listed. [] Progression has been slowed due to co-morbidities. [] Plan just implemented, too soon to assess goals progression <30days   [] Goals require adjustment due to lack of progress  [] Patient is not progressing as expected and requires additional follow up with physician  [] Other    Prognosis for POC: [x] Good [] Fair  [] Poor    Patient requires continued skilled intervention: [x] Yes  [] No        PLAN: 2x per week for 2-3 weeks  [x] Continue per plan of care [] Alter current plan (see comments)  [] Plan of care initiated [] Hold pending MD visit [] Discharge    Electronically signed by: Akbar Tejada, PT, DPT, MS, SCS    Note: If patient does not return for scheduled/recommended follow up visits, this note will serve as a discharge from care along with the most recent update on progress.

## 2023-03-28 ENCOUNTER — HOSPITAL ENCOUNTER (OUTPATIENT)
Dept: PHYSICAL THERAPY | Age: 23
Setting detail: THERAPIES SERIES
Discharge: HOME | End: 2023-03-28
Payer: COMMERCIAL

## 2023-03-28 PROCEDURE — 97140 MANUAL THERAPY 1/> REGIONS: CPT

## 2023-03-28 PROCEDURE — 97110 THERAPEUTIC EXERCISES: CPT

## 2023-03-28 NOTE — FLOWSHEET NOTE
limited by other medical complications  [] Other:     Overall Progression Towards Functional goals/ Treatment Progress Update:  [x] Patient is progressing as expected towards functional goals listed. [] Progression is slowed due to complexities/Impairments listed. [] Progression has been slowed due to co-morbidities. [] Plan just implemented, too soon to assess goals progression <30days   [] Goals require adjustment due to lack of progress  [] Patient is not progressing as expected and requires additional follow up with physician  [] Other    Prognosis for POC: [x] Good [] Fair  [] Poor    Patient requires continued skilled intervention: [x] Yes  [] No        PLAN: 2x per week for 2-3 weeks  [x] Continue per plan of care [] Alter current plan (see comments)  [] Plan of care initiated [] Hold pending MD visit [] Discharge    Electronically signed by: Kelvin Gardner, PT, DPT, MS, SCS    Note: If patient does not return for scheduled/recommended follow up visits, this note will serve as a discharge from care along with the most recent update on progress.

## 2023-03-30 ENCOUNTER — HOSPITAL ENCOUNTER (OUTPATIENT)
Dept: PHYSICAL THERAPY | Age: 23
Setting detail: THERAPIES SERIES
Discharge: HOME | End: 2023-03-30
Payer: COMMERCIAL

## 2023-03-30 PROCEDURE — 97140 MANUAL THERAPY 1/> REGIONS: CPT

## 2023-03-30 PROCEDURE — 97110 THERAPEUTIC EXERCISES: CPT

## 2023-03-30 NOTE — PLAN OF CARE
however, she does still have some noticeable strength deficits with hip abduction on the L and quad weakness specifically at greater knee flexion angles from 75-90 degrees. Incorporating these knee flexion angles into POC to address these deficits in addition to cont hip motor control progression. Needs cont work to allow full pain free performance with squats, lunges, and stairs. Patient had an MRI on Tuesday and will receive those results on Monday at follow up with Dr. Yessica Jaeger. Physical Therapy Treatment Note/ Progress Report:       Date:  2023    Patient Name:  Alivia Kate    :  2000  MRN: [id number]  Restrictions/Precautions:    Medical/Treatment Diagnosis Information:   Diagnosis:  L knee pain   Treatment diagnosis:  M25.562 L knee pain; M22.22 Patellofemoral disorders of L knee       Insurance/Certification information:  Aetna - ded not met/no copay/auth   Physician Information:  Diana Aguilera MD  Plan of care signed (Y/N):     Date of Patient follow up with Physician:      Progress Report: [x]  Yes  []  No     Date Range for reporting period:  Beginnin23  Endin visits or 30 days    Progress report due (10 Rx/or 30 days whichever is less): 3/77/07    Recertification due (POC duration/ or 90 days whichever is less): 23    Visit # Insurance Allowable Auth Needed   12 Aetna - 20 PT visits []Yes    [x]No     Latex Allergy:  [x]NO      []YES  Preferred Language for Healthcare:   [x]English       []other:  Functional Scale: 5% disability - LEFS (76/80)     Date assessed:3/30/23    Pain level:  0/10 at start of session. 0/10 at best. 5-6/10 at worst.      SUBJECTIVE:  Does feel like she is making progress. Reports no pain with going up/down stairs after advancements in strength tasks last session. MRI went well on Tuesday. Scheduled for follow up with Dr. Yessica Jaeger on Monday to go over the results.      OBJECTIVE: 3/30/23  Observation:   Test measurements:    ROM LEFT RIGHT   HIP

## 2023-04-03 ENCOUNTER — OFFICE VISIT (OUTPATIENT)
Dept: ORTHOPEDIC SURGERY | Age: 23
End: 2023-04-03
Payer: COMMERCIAL

## 2023-04-03 VITALS — HEIGHT: 66 IN | WEIGHT: 133 LBS | BODY MASS INDEX: 21.38 KG/M2

## 2023-04-03 DIAGNOSIS — M25.562 LEFT KNEE PAIN, UNSPECIFIED CHRONICITY: Primary | ICD-10-CM

## 2023-04-03 PROCEDURE — 99213 OFFICE O/P EST LOW 20 MIN: CPT | Performed by: EMERGENCY MEDICINE

## 2023-04-03 NOTE — PROGRESS NOTES
FOLLOW UP VISIT    Chief Complaint   Patient presents with    Knee Pain     LEFT KNEE-MRI RESULTS       HPI:    Jae Mcgee is a 26 y.o. female who presents for MRI results - left knee. At the last visit on 3/13/2023, the patient was presenting with acute on chronic left knee pain. It seems most likely consistent with patellar maltracking. However, she had failed an outpatient course of physical therapy. This has been ongoing. Therefore, we proceeded with advanced imaging including an MRI of the left knee. Since the last visit, Jae Mcgee has noted some mild improvement of her symptoms. She states that she is doing well with physical therapy. However, she does have persistent symptoms particularly worse with working out and when going up and down stairs. She denies any swelling. No numbness or tingling. She denies any pain at rest.  No other complaints. Review of Systems:  Review of Systems   Musculoskeletal:  Negative for joint swelling and myalgias. Skin:  Negative for rash. Neurological:  Negative for numbness. Medical History  Patient's medications, allergies, past medical, surgical, social, and family histories were reviewed and updated as appropriate.     Physical Examination:  General: Well appearing female, in no acute distress  Respiratory: Normal respiratory effort  Cardiovascular: No visual or palpable edema  Skin: no identified rashes, no induration, erythema or cyanosis  Neurologic: Light touch sensation is intact, no allodynia or hyperalgesia  Gait: Normal gait and station  Extremities: No evidence of clubbing, cyanosis, tenosynovitis or nail pitting  MSK:  Left knee  Inspection/Palpation: No joint line tenderness or tenderness over the patella  ROM: Full range of motion  Stability: No ligamentous instability at 0 and 30 degrees flexion with valgus and varus stress  Strength/Tone: 5/5, normal tone  Special Tests: Negative patellar grind      Radiology:  MRI images of the

## 2023-04-04 ENCOUNTER — HOSPITAL ENCOUNTER (OUTPATIENT)
Dept: PHYSICAL THERAPY | Age: 23
Setting detail: THERAPIES SERIES
Discharge: HOME | End: 2023-04-04
Payer: COMMERCIAL

## 2023-04-04 PROCEDURE — 97110 THERAPEUTIC EXERCISES: CPT

## 2023-04-04 PROCEDURE — 97140 MANUAL THERAPY 1/> REGIONS: CPT

## 2023-04-04 NOTE — FLOWSHEET NOTE
restriction. [x] Progressing: [] Met: [] Not Met: [] Adjusted  Comment:  Still noting some intermittent discomfort with going down stairs. 4. Patient will be able to squat and lunge without increased symptoms or restriction. [x] Progressing: [] Met: [] Not Met: [] Adjusted  Comment:  Now tolerating full range static lunges pain free. Squat tolerance progressing. ASSESSMENT:     Cont session focus on progression of hip motor control tasks pushing deeper into greater knee flexion angles. Able to progress to standing lunges where patient actually has to step out into that lunge position and push back up from that position for each rep. Also able to progress squatting tasks to include BOSU squats encouraging patient to push into deeper knee flexion angles. Needs cont work to allow full pain free performance with squats, lunges, and stairs, but making good progress every session. Radiologist did have some concerns about patient's recent MRI, so patient is scheduled to have a CT scan next Monday. Return to Play: (if applicable)   []  Stage 1: Intro to Strength   []  Stage 2: Return to Run and Strength   []  Stage 3: Return to Jump and Strength   []  Stage 4: Dynamic Strength and Agility   []  Stage 5: Sport Specific Training     []  Ready to Return to Play, Meets All Above Stages   []  Not Ready for Return to Sports   Comments:            Treatment/Activity Tolerance:  [x] Patient tolerated treatment well [] Patient limited by fatique  [] Patient limited by pain  [] Patient limited by other medical complications  [] Other:     Overall Progression Towards Functional goals/ Treatment Progress Update:  [x] Patient is progressing as expected towards functional goals listed. [] Progression is slowed due to complexities/Impairments listed. [] Progression has been slowed due to co-morbidities.   [] Plan just implemented, too soon to assess goals progression <30days   [] Goals require adjustment due to lack of

## 2023-04-06 ENCOUNTER — HOSPITAL ENCOUNTER (OUTPATIENT)
Dept: PHYSICAL THERAPY | Age: 23
Setting detail: THERAPIES SERIES
Discharge: HOME | End: 2023-04-06
Payer: COMMERCIAL

## 2023-04-06 PROCEDURE — 97110 THERAPEUTIC EXERCISES: CPT

## 2023-04-06 PROCEDURE — 97140 MANUAL THERAPY 1/> REGIONS: CPT

## 2023-04-06 NOTE — FLOWSHEET NOTE
[place] [address] [facility]  Phone: [phone] Fax: [phone]    Physical Therapy Treatment Note/ Progress Report:       Date:  2023    Patient Name:  Yani Laughlin    :  2000  MRN: [id number]  Restrictions/Precautions:    Medical/Treatment Diagnosis Information:   Diagnosis:  L knee pain   Treatment diagnosis:  M25.562 L knee pain; M22.22 Patellofemoral disorders of L knee       Insurance/Certification information:  Aetna - ded not met/no copay/auth   Physician Information:  Thalia Tinoco MD  Plan of care signed (Y/N):     Date of Patient follow up with Physician:      Progress Report: []  Yes  [x]  No     Date Range for reporting period:  Beginnin23  Endin visits or 30 days    Progress report due (10 Rx/or 30 days whichever is less): 34    Recertification due (POC duration/ or 90 days whichever is less): 23    Visit # Insurance Allowable Auth Needed   14 Aetna - 20 PT visits []Yes    [x]No     Latex Allergy:  [x]NO      []YES  Preferred Language for Healthcare:   [x]English       []other:  Functional Scale: 5% disability - LEFS (76/80)     Date assessed:3/30/23    Pain level:  0/10 at start of session. 0/10 at best. 5-6/10 at worst.      SUBJECTIVE:  Patient is scheduled for a CT scan next Monday. Lifted legs this morning. Not sure how that is going to affect session today.      OBJECTIVE: 3/30/23  Observation:   Test measurements:    ROM LEFT RIGHT   HIP Flex WNL WNL   HIP Abd       HIP Ext       HIP IR WNL WNL   HIP ER WNL WNL   Knee ext 0//+2 Hyper 0/+2 Hyper   Knee Flex 142 142   Ankle PF       Ankle DF       Ankle In       Ankle Ev       Strength  LEFT RIGHT   HIP Flexors       HIP Abductors 19.5# 23.6#   HIP Ext 20.1# 22.7#   Hip ER       Knee EXT (quad) 52.2# 55.0#   Knee Flex (HS) 41.9# 42.9#   Ankle DF       Ankle PF       Ankle Inv       Ankle EV               Circumference  Mid

## 2023-04-17 ENCOUNTER — OFFICE VISIT (OUTPATIENT)
Dept: ORTHOPEDIC SURGERY | Age: 23
End: 2023-04-17
Payer: COMMERCIAL

## 2023-04-17 VITALS — WEIGHT: 133 LBS | HEIGHT: 66 IN | BODY MASS INDEX: 21.38 KG/M2

## 2023-04-17 DIAGNOSIS — M22.2X2 PATELLOFEMORAL SYNDROME OF LEFT KNEE: ICD-10-CM

## 2023-04-17 DIAGNOSIS — M25.562 LEFT KNEE PAIN, UNSPECIFIED CHRONICITY: Primary | ICD-10-CM

## 2023-04-17 PROCEDURE — 99213 OFFICE O/P EST LOW 20 MIN: CPT | Performed by: EMERGENCY MEDICINE

## 2023-04-17 NOTE — PROGRESS NOTES
FOLLOW UP VISIT    Chief Complaint   Patient presents with    Knee Pain     CT LEFT KNEE       HPI:    Milagros Rose is a 26 y.o. female who presents for CT results - left knee. At the last visit on 4/3/2023, the patient was presenting for MRI review. 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 Therefore, CT was ordered. Since the last visit, Milagros Rose has noted some mild improvement of her symptoms. She states that she is doing well with physical therapy. However, she does have persistent symptoms particularly worse with working out and when going up and down stairs. She denies any swelling. No numbness or tingling. She denies any pain at rest.  No other complaints. Review of Systems:  Review of Systems   Musculoskeletal:  Negative for joint swelling and myalgias. Skin:  Negative for rash. Neurological:  Negative for numbness. Medical History  Patient's medications, allergies, past medical, surgical, social, and family histories were reviewed and updated as appropriate. Physical Examination:  General: Well appearing female, in no acute distress  Respiratory: Normal respiratory effort  Cardiovascular: No visual or palpable edema  Skin: no identified rashes, no induration, erythema or cyanosis  Neurologic: Light touch sensation is intact, no allodynia or hyperalgesia  Gait: Normal gait and station  Extremities: No evidence of clubbing, cyanosis, tenosynovitis or nail pitting  MSK: Left knee: No gross deformity, full range of motion without pain, no swelling      Radiology:  CT images of the left knee dated 4/10/2023 were reviewed and discussed with the patient. The films revealed:     1. No fracture or malalignment. Preserved joint spaces. 2.  No definable osteoid osteoma as clinically question. 3.  No lytic or blastic osseous lesion    Assessment/Treatment Plan: Milagros Rose is a 26 y.o. female with:    1. Left knee pain, unspecified chronicity  2.  Patellofemoral

## 2023-04-18 ENCOUNTER — HOSPITAL ENCOUNTER (OUTPATIENT)
Dept: PHYSICAL THERAPY | Age: 23
Setting detail: THERAPIES SERIES
Discharge: HOME | End: 2023-04-18
Payer: COMMERCIAL

## 2023-04-18 PROCEDURE — 97140 MANUAL THERAPY 1/> REGIONS: CPT

## 2023-04-18 PROCEDURE — 97110 THERAPEUTIC EXERCISES: CPT

## 2023-04-18 NOTE — FLOWSHEET NOTE
NMR re-education (57266)   CUES NEEDED   Maltese/Biofeedback 10/10      BFR      G. Med activation      Hip Ext full ROM/ G. Activation      Bosu Bal and Prop- G Med      Single leg stance/Balance/Prop      Bosu Retro G. Med act      Prone Hip froggers- sliders/elevated            Therapeutic Activity (35376)      Ladders      [employer]      Dynamic Balance                            Therapeutic Exercise and NMR EXR  [x] (52824) Provided verbal/tactile cueing for activities related to strengthening, flexibility, endurance, ROM for improvements in LE, proximal hip, and core control with self care, mobility, lifting, ambulation. [x] (13969) Provided verbal/tactile cueing for activities related to improving balance, coordination, kinesthetic sense, posture, motor skill, proprioception  to assist with LE, proximal hip, and core control in self care, mobility, lifting, ambulation and eccentric single leg control.      NMR and Therapeutic Activities:    [x] (79189 or 36472) Provided verbal/tactile cueing for activities related to improving balance, coordination, kinesthetic sense, posture, motor skill, proprioception and motor activation to allow for proper function of core, proximal hip and LE with self care and ADLs and functional mobility.   [] (30430) Gait Re-education- Provided training and instruction to the patient for proper LE, core and proximal hip recruitment and positioning and eccentric body weight control with ambulation re-education including up and down stairs     Home Exercise Program:    [x] (60228) Reviewed/Progressed HEP activities related to strengthening, flexibility, endurance, ROM of core, proximal hip and LE for functional self-care, mobility, lifting and ambulation/stair navigation   [] (77130)Reviewed/Progressed HEP activities related to improving balance, coordination, kinesthetic sense, posture, motor skill, proprioception of core, proximal hip and LE for self care, mobility, lifting, and

## 2023-04-20 ENCOUNTER — HOSPITAL ENCOUNTER (OUTPATIENT)
Dept: PHYSICAL THERAPY | Age: 23
Setting detail: THERAPIES SERIES
Discharge: HOME | End: 2023-04-20
Payer: COMMERCIAL

## 2023-04-20 PROCEDURE — 97140 MANUAL THERAPY 1/> REGIONS: CPT

## 2023-04-20 PROCEDURE — 97110 THERAPEUTIC EXERCISES: CPT

## 2023-04-20 NOTE — FLOWSHEET NOTE
activities related to improving balance, coordination, kinesthetic sense, posture, motor skill, proprioception of core, proximal hip and LE for self care, mobility, lifting, and ambulation/stair navigation      Manual Treatments:  PROM / STM / Oscillations-Mobs:  G-I, II, III, IV (PA's, Inf., Post.)  [x] (04428) Provided manual therapy to mobilize LE, proximal hip and/or LS spine soft tissue/joints for the purpose of modulating pain, promoting relaxation,  increasing ROM, reducing/eliminating soft tissue swelling/inflammation/restriction, improving soft tissue extensibility and allowing for proper ROM for normal function with self care, mobility, lifting and ambulation. Modalities:     [] GAME READY (VASO)- for significant edema, swelling, pain control. Charges:  Timed Code Treatment Minutes: 52   Total Treatment Minutes: 52      [] EVAL (LOW) 25938 (typically 20 minutes face-to-face)  [] EVAL (MOD) 42034 (typically 30 minutes face-to-face)  [] EVAL (HIGH) 06560 (typically 45 minutes face-to-face)  [] RE-EVAL     [x] HT(89760) x 2    [] DRY NEEDLE 1 OR 2 MUSCLES  [] NMR (59092) x     [] DRY NEEDLE 3+ MUSCLES  [x] Manual (26739) x 1      [] TA (29662) x     [] Mech Traction (87039)  [] ES(attended) (25228)     [] ES (un) (13252):   [] VASO (27056)  [] Other:      GOALS:  Patient stated goal: Be able to lift without pain. [x] Progressing: [] Met: [] Not Met: [] Adjusted  Therapist goals for Patient:   Short Term Goals: To be achieved in: 2 weeks  1. Independent in HEP and progression per patient tolerance, in order to prevent re-injury. [] Progressing: [x] Met: [] Not Met: [] Adjusted  2. Patient will have a decrease in pain to facilitate improvement in movement, function, and ADLs as indicated by Functional Deficits. [] Progressing: [x] Met: [] Not Met: [] Adjusted    Long Term Goals: To be achieved in: 6-8 weeks  1.  Disability index score of 5% or less for the LEFS to assist with reaching prior level of

## 2023-04-25 ENCOUNTER — HOSPITAL ENCOUNTER (OUTPATIENT)
Dept: PHYSICAL THERAPY | Age: 23
Setting detail: THERAPIES SERIES
Discharge: HOME | End: 2023-04-25
Payer: COMMERCIAL

## 2023-04-25 PROCEDURE — 97110 THERAPEUTIC EXERCISES: CPT

## 2023-04-25 PROCEDURE — 97140 MANUAL THERAPY 1/> REGIONS: CPT

## 2023-04-25 NOTE — FLOWSHEET NOTE
[place] [address] [facility]  Phone: [phone] Fax: [phone]    Physical Therapy Treatment Note/ Progress Report:       Date:  2023    Patient Name:  Chen Ojeda    :  2000  MRN: [id number]  Restrictions/Precautions:    Medical/Treatment Diagnosis Information:   Diagnosis:  L knee pain   Treatment diagnosis:  M25.562 L knee pain; M22.22 Patellofemoral disorders of L knee       Insurance/Certification information:  [employer] - ded not met/no copay/auth   Physician Information:  Zeferino Moss MD  Plan of care signed (Y/N):     Date of Patient follow up with Physician:      Progress Report: []  Yes  [x]  No     Date Range for reporting period:  Beginnin23  Endin visits or 30 days    Progress report due (10 Rx/or 30 days whichever is less):     Recertification due (POC duration/ or 90 days whichever is less): 23    Visit # Insurance Allowable Auth Needed   19 [employer] - 20 PT visits []Yes    [x]No     Latex Allergy:  [x]NO      []YES  Preferred Language for Healthcare:   [x]English       []other:  Functional Scale: 5% disability - LEFS (76/80)     Date assessed:3/30/23    Pain level:  0/10 at start of session. 0/10 at best. 5-6/10 at worst.      SUBJECTIVE:  Good response to last session. Still reporting at least one incident of L knee pain with going up stairs every day at least 6 days a week, however, this pain does not linger. Tends to resolve immediately after completing that flight of stairs.      OBJECTIVE: 3/30/23  Observation:   Test measurements:    ROM LEFT RIGHT   HIP Flex WNL WNL   HIP Abd       HIP Ext       HIP IR WNL WNL   HIP ER WNL WNL   Knee ext 0//+2 Hyper 0/+2 Hyper   Knee Flex 142 142   Ankle PF       Ankle DF       Ankle In       Ankle Ev       Strength  LEFT RIGHT   HIP Flexors       HIP Abductors 19.5# 23.6#   HIP Ext 20.1# 22.7#   Hip ER       Knee EXT (quad) 52.2# 55.0#

## 2023-04-27 ENCOUNTER — HOSPITAL ENCOUNTER (OUTPATIENT)
Dept: PHYSICAL THERAPY | Age: 23
Setting detail: THERAPIES SERIES
Discharge: HOME | End: 2023-04-27
Payer: COMMERCIAL

## 2023-04-27 PROCEDURE — 97110 THERAPEUTIC EXERCISES: CPT

## 2023-04-27 NOTE — FLOWSHEET NOTE
Called patient to follow-up on increased dose of Lantus from 22 units to 25 units at bedtime on 2/28/18. Patient reports the following blood sugars:    Date FBG/ 2hours post   3/7 172   3/6 197   3/5 229     Reports feeling very sick since increasing dose of Lantus, but also says she may have a cold. She feels weak and has body aches. Denies symptoms of hypoglycemia such javi shakiness, dizziness, sweating. She plans to be seen by PCP on Friday if she is not feeling better tomorrow.     Will plan to continue current dose of Lantus and consider increasing dose once she is feeling better.      Suyapa Melvin, PD4  Ambulatory Care Pharmacy Intern  Mirna Perez, Pharm.D, BCACP  Medication Therapy Management Pharmacist    
LEFS to assist with reaching prior level of function. [] Progressing: [x] Met: [] Not Met: [] Adjusted  2. Patient will demonstrate an increase in hip and quad strength and control, within 5# HHD in LE to allow for proper functional mobility as indicated by patients Functional Deficits. [x] Progressing: [] Met: [] Not Met: [] Adjusted  Comment:  See HHD measures above. 3. Patient will be able to go up/down stairs with reciprocal mechanics without increased symptoms or restriction. [x] Progressing: [] Met: [] Not Met: [] Adjusted  Comment:  Still noting some intermittent discomfort with going down stairs. 4. Patient will be able to squat and lunge without increased symptoms or restriction. [x] Progressing: [] Met: [] Not Met: [] Adjusted  Comment:  Now tolerating full range static lunges pain free. Squat tolerance progressing. ASSESSMENT:    Still experiencing pain on L knee primarily with concentric phase of stairs; only reproduced today with end of sets of TRX squat to chair. Focused today more on cueing for a slow eccentric phase followed by a quick concentric phase to address her functional complaints. This did mildly reproduce her pain with tasks like lateral step downs, TRX SL squat taps to chair, and glut step ups on 10\". Needs consistent work to assess progress with functional complaints. Pt does have functional knee valgus with lunging, R more prevalent than L today.     Return to Play: (if applicable)   []  Stage 1: Intro to Strength   []  Stage 2: Return to Run and Strength   []  Stage 3: Return to Jump and Strength   []  Stage 4: Dynamic Strength and Agility   []  Stage 5: Sport Specific Training     []  Ready to Return to Play, Meets All Above Stages   []  Not Ready for Return to Sports   Comments:            Treatment/Activity Tolerance:  [x] Patient tolerated treatment well [] Patient limited by fatique  [] Patient limited by pain  [] Patient limited by other medical complications  []